# Patient Record
Sex: MALE | Race: WHITE | NOT HISPANIC OR LATINO | Employment: OTHER | URBAN - METROPOLITAN AREA
[De-identification: names, ages, dates, MRNs, and addresses within clinical notes are randomized per-mention and may not be internally consistent; named-entity substitution may affect disease eponyms.]

---

## 2023-12-11 ENCOUNTER — TELEPHONE (OUTPATIENT)
Dept: CARDIOLOGY | Facility: HOSPITAL | Age: 68
End: 2023-12-11
Payer: MEDICARE

## 2023-12-13 ENCOUNTER — HOSPITAL ENCOUNTER (OUTPATIENT)
Dept: RADIOLOGY | Facility: HOSPITAL | Age: 68
Discharge: HOME | End: 2023-12-13
Payer: MEDICARE

## 2023-12-13 ENCOUNTER — OFFICE VISIT (OUTPATIENT)
Dept: CARDIOLOGY | Facility: HOSPITAL | Age: 68
End: 2023-12-13
Payer: MEDICARE

## 2023-12-13 VITALS
WEIGHT: 195.55 LBS | SYSTOLIC BLOOD PRESSURE: 152 MMHG | OXYGEN SATURATION: 96 % | BODY MASS INDEX: 29.73 KG/M2 | DIASTOLIC BLOOD PRESSURE: 82 MMHG | HEART RATE: 78 BPM

## 2023-12-13 DIAGNOSIS — E78.5 HYPERLIPIDEMIA, UNSPECIFIED HYPERLIPIDEMIA TYPE: ICD-10-CM

## 2023-12-13 DIAGNOSIS — R06.02 SHORTNESS OF BREATH: Primary | ICD-10-CM

## 2023-12-13 DIAGNOSIS — I10 HYPERTENSION, UNSPECIFIED TYPE: ICD-10-CM

## 2023-12-13 DIAGNOSIS — I25.10 CORONARY ARTERY DISEASE INVOLVING NATIVE HEART WITHOUT ANGINA PECTORIS, UNSPECIFIED VESSEL OR LESION TYPE: ICD-10-CM

## 2023-12-13 DIAGNOSIS — R06.02 SHORTNESS OF BREATH: ICD-10-CM

## 2023-12-13 PROCEDURE — 1036F TOBACCO NON-USER: CPT | Performed by: NURSE PRACTITIONER

## 2023-12-13 PROCEDURE — 1159F MED LIST DOCD IN RCRD: CPT | Performed by: NURSE PRACTITIONER

## 2023-12-13 PROCEDURE — 1160F RVW MEDS BY RX/DR IN RCRD: CPT | Performed by: NURSE PRACTITIONER

## 2023-12-13 PROCEDURE — 99214 OFFICE O/P EST MOD 30 MIN: CPT | Performed by: NURSE PRACTITIONER

## 2023-12-13 PROCEDURE — 99214 OFFICE O/P EST MOD 30 MIN: CPT | Mod: PO,25 | Performed by: NURSE PRACTITIONER

## 2023-12-13 PROCEDURE — 71046 X-RAY EXAM CHEST 2 VIEWS: CPT | Mod: FY

## 2023-12-13 PROCEDURE — 71046 X-RAY EXAM CHEST 2 VIEWS: CPT | Performed by: STUDENT IN AN ORGANIZED HEALTH CARE EDUCATION/TRAINING PROGRAM

## 2023-12-13 PROCEDURE — 3077F SYST BP >= 140 MM HG: CPT | Performed by: NURSE PRACTITIONER

## 2023-12-13 PROCEDURE — 3079F DIAST BP 80-89 MM HG: CPT | Performed by: NURSE PRACTITIONER

## 2023-12-13 RX ORDER — ASPIRIN 81 MG/1
1 TABLET ORAL DAILY
COMMUNITY

## 2023-12-13 RX ORDER — ROSUVASTATIN CALCIUM 10 MG/1
10 TABLET, COATED ORAL DAILY
COMMUNITY

## 2023-12-13 RX ORDER — LOSARTAN POTASSIUM 100 MG/1
100 TABLET ORAL DAILY
COMMUNITY
Start: 2023-10-08 | End: 2024-01-06

## 2023-12-13 RX ORDER — CITALOPRAM 20 MG/1
20 TABLET, FILM COATED ORAL DAILY
COMMUNITY

## 2023-12-13 RX ORDER — MILK THISTLE 150 MG
1 CAPSULE ORAL DAILY
COMMUNITY
Start: 2022-02-16 | End: 2024-05-20 | Stop reason: ALTCHOICE

## 2023-12-13 RX ORDER — FAMOTIDINE 40 MG/1
40 TABLET, FILM COATED ORAL DAILY
COMMUNITY

## 2023-12-13 ASSESSMENT — ENCOUNTER SYMPTOMS
DEPRESSION: 0
CONSTITUTIONAL NEGATIVE: 1
ENDOCRINE NEGATIVE: 1
COUGH: 1
MYALGIAS: 1
PSYCHIATRIC NEGATIVE: 1
HEMATOLOGIC/LYMPHATIC NEGATIVE: 1
GASTROINTESTINAL NEGATIVE: 1
EYES NEGATIVE: 1
NEUROLOGICAL NEGATIVE: 1

## 2023-12-13 NOTE — PATIENT INSTRUCTIONS
CALL WITH ANY QUESTIONS   NO MEDICATION CHANGES   NUCLEAR STRESS TEST   CHEST X-RAY   WILL CALL TO REVIEW

## 2023-12-13 NOTE — PROGRESS NOTES
"Referred by Dr. Abrams ref. provider found for Chest Pain (Intermittent \"pressure\" and \"burning sensation\" s/p 3 days after receiving the RSV vaccine in the beginning of November of 2023.  States it can be in the L side, R side or both sides.) and Shortness of Breath (Upon physical exertion s/p the RSV vaccination.)     History Of Present Illness:    Brayan Barron Jr. is a very pleasant 68 year old gentleman with a history of CAD, HTN, HLD and mild aortic stenosis, he is here for a follow up visit. The patient is seen in collaboration with Dr. Lemus. Mr. Barron complains of chest tightness and a cough that lasted three and a half weeks after getting the RSV and flu shot. Since then has noticed he has shortness of breath and tightness in his chest walking up stairs and working in the yard. He states the symptoms are similar prior to having his stent placed.     Review of Systems   Constitutional: Negative.   HENT: Negative.     Eyes: Negative.    Cardiovascular:  Positive for chest pain.   Respiratory:  Positive for cough.    Endocrine: Negative.    Hematologic/Lymphatic: Negative.    Skin: Negative.    Musculoskeletal:  Positive for myalgias.   Gastrointestinal: Negative.    Neurological: Negative.    Psychiatric/Behavioral: Negative.            Past Medical History:  He has no past medical history on file.    Past Surgical History:  He has no past surgical history on file.      Social History:  He reports that he has never smoked. He has never used smokeless tobacco. He reports current alcohol use. He reports that he does not use drugs.    Family History:  No family history on file.     Allergies:  Codeine    Outpatient Medications:  Current Outpatient Medications   Medication Instructions    aspirin 81 mg EC tablet 1 tablet, oral, Daily    citalopram (CELEXA) 20 mg, oral, Daily    famotidine (PEPCID) 40 mg, oral, Daily    losartan (COZAAR) 100 mg, oral, Daily    quercetin 500 mg capsule 1 capsule, oral, Daily    " "rosuvastatin (CRESTOR) 10 mg, oral, Daily        Last Recorded Vitals:  Vitals:    12/13/23 1406   BP: (!) 172/100   Pulse: 78   SpO2: 96%   Weight: 88.7 kg (195 lb 8.8 oz)       Physical Exam:  Physical Exam  Vitals reviewed.   HENT:      Head: Normocephalic.      Nose: Nose normal.   Eyes:      Pupils: Pupils are equal, round, and reactive to light.   Cardiovascular:      Rate and Rhythm: Normal rate and regular rhythm. 2/6 YOON   Pulmonary:      Effort: Pulmonary effort is normal.      Breath sounds: Normal breath sounds.   Abdominal:      General: Abdomen is flat.      Palpations: Abdomen is soft.   Musculoskeletal:         General: Normal range of motion.      Cervical back: Normal range of motion.   Skin:     General: Skin is warm and dry.   Neurological:      General: No focal deficit present.      Mental Status: He is alert and oriented to person, place, and time.   Psychiatric:         Mood and Affect: Mood normal.            Last Labs:  CBC -  Lab Results   Component Value Date    WBC 7.7 01/28/2022    HGB 14.2 01/28/2022    HCT 44.4 01/28/2022    MCV 93 01/28/2022     01/28/2022       CMP -  Lab Results   Component Value Date    CALCIUM 9.9 01/28/2022       LIPID PANEL -   No results found for: \"CHOL\", \"TRIG\", \"HDL\", \"CHHDL\", \"LDLF\", \"VLDL\", \"NHDL\"    RENAL FUNCTION PANEL -   Lab Results   Component Value Date    GLUCOSE 94 01/28/2022     01/28/2022    K 4.8 01/28/2022     01/28/2022    CO2 29 01/28/2022    ANIONGAP 9 (L) 01/28/2022    BUN 32 (H) 01/28/2022    CREATININE 1.32 (H) 01/28/2022    GFRMALE 58 (A) 01/28/2022    CALCIUM 9.9 01/28/2022        No results found for: \"BNP\", \"HGBA1C\"    Last Cardiology Tests:  ECG:    Echo:  Echocardiogram 1/21/2022  1. The left ventricular systolic function is normal with a 60-65% estimated ejection fraction.  2. RVSP within normal limits.  3. Mild to moderate aortic valve stenosis.  4. The aortic valve dimensionless index is 0.35. There is no " evidence of aortic valve regurgitation. The peak instantaneous gradient of the aortic valve is 19.5 mmHg. The mean gradient of the aortic valve is 11.0 mmHg. SHERRI 1.44 cm2.  Ejection Fractions:  LVEF 60-65%  Cath:  Heart cath 2/4/2022  1. Left main: mild irregularities.  2. LAD: 30% mid-vessel disease, 50% ostial D1.  3. LCx: no significant angiographic disease.  4. RCA: 90% distal stenosis, 90% ostial rPDA stenosis, 90% ostial rPDA disease ([place] 1,1,1).  5. LVEDP 12mmHg, no aortic stenosis on LV-Ao gradient.  6. Successful PCI to severe distal RCA bifurcation stenosis ([place] 1,1,1) with one Synergy LIZANDRO with PTCA of rPLV sidebranch.    Stress Test:    Cardiac Imaging:  CT HEART CALCIUM SCORING WO IV CONTRAST 01/25/2022  CT calcium score 1/25/2022  LM 40.2,  .3,  LCx 0,  RCA 69.9,     Total 483.4    Assessment/Plan   Mr. Barron is a very pleasant 68 year old gentleman with a history of aortic stenosis, HTN, HLD and a family history of CAD, he had a CT calcium score of 483, heart cath with a PCI to the RCA (2022). He complains of chest pressure and exertional dyspnea. He will be set up for a nuclear stress test.  Blood pressure is elevated today, at home has been well controlled, will continue to monitor.      Plan   -call with any questions   -continue Aspirin indefinitely   -continue Losartan and Crestor   -nuclear stress test   -chest x-ray for persistent cough  -will call to review the results         Ruth Dumont, APRN-CNP

## 2023-12-20 ENCOUNTER — HOSPITAL ENCOUNTER (OUTPATIENT)
Dept: RADIOLOGY | Facility: HOSPITAL | Age: 68
Discharge: HOME | End: 2023-12-20
Payer: MEDICARE

## 2023-12-20 ENCOUNTER — HOSPITAL ENCOUNTER (OUTPATIENT)
Dept: CARDIOLOGY | Facility: HOSPITAL | Age: 68
Discharge: HOME | End: 2023-12-20
Payer: MEDICARE

## 2023-12-20 DIAGNOSIS — R06.02 SHORTNESS OF BREATH: ICD-10-CM

## 2023-12-20 PROCEDURE — 93017 CV STRESS TEST TRACING ONLY: CPT

## 2023-12-20 PROCEDURE — 78452 HT MUSCLE IMAGE SPECT MULT: CPT | Performed by: RADIOLOGY

## 2023-12-20 PROCEDURE — 93016 CV STRESS TEST SUPVJ ONLY: CPT | Performed by: STUDENT IN AN ORGANIZED HEALTH CARE EDUCATION/TRAINING PROGRAM

## 2023-12-20 PROCEDURE — 3430000001 HC RX 343 DIAGNOSTIC RADIOPHARMACEUTICALS: Performed by: NURSE PRACTITIONER

## 2023-12-20 PROCEDURE — 78452 HT MUSCLE IMAGE SPECT MULT: CPT

## 2023-12-20 PROCEDURE — A9502 TC99M TETROFOSMIN: HCPCS | Performed by: NURSE PRACTITIONER

## 2023-12-20 RX ADMIN — TETROFOSMIN 32.7 MILLICURIE: 0.23 INJECTION, POWDER, LYOPHILIZED, FOR SOLUTION INTRAVENOUS at 11:45

## 2023-12-20 RX ADMIN — TETROFOSMIN 10.6 MILLICURIE: 0.23 INJECTION, POWDER, LYOPHILIZED, FOR SOLUTION INTRAVENOUS at 10:32

## 2024-01-02 ENCOUNTER — TELEPHONE (OUTPATIENT)
Dept: CARDIOLOGY | Facility: HOSPITAL | Age: 69
End: 2024-01-02
Payer: MEDICARE

## 2024-01-02 NOTE — TELEPHONE ENCOUNTER
----- Message from JACKIE Aceves sent at 12/26/2023 12:52 PM EST -----  Regarding: FW:  Please call and let him know his stress test is normal   Thanks  ----- Message -----  From: Interface, Radiology Results In  Sent: 12/20/2023   3:03 PM EST  To: JACKIE Aceves

## 2024-05-17 PROBLEM — I25.10 CAD (CORONARY ARTERY DISEASE): Status: ACTIVE | Noted: 2024-05-17

## 2024-05-17 PROBLEM — I10 ESSENTIAL HYPERTENSION: Status: ACTIVE | Noted: 2024-05-17

## 2024-05-17 PROBLEM — I35.9 AORTIC VALVULAR DISORDER: Status: ACTIVE | Noted: 2024-05-17

## 2024-05-17 PROBLEM — E78.5 HYPERLIPIDEMIA: Status: ACTIVE | Noted: 2024-05-17

## 2024-05-17 PROBLEM — R06.02 SHORTNESS OF BREATH AT REST: Status: ACTIVE | Noted: 2024-05-17

## 2024-05-20 ENCOUNTER — OFFICE VISIT (OUTPATIENT)
Dept: PRIMARY CARE | Facility: CLINIC | Age: 69
End: 2024-05-20
Payer: MEDICARE

## 2024-05-20 ENCOUNTER — LAB (OUTPATIENT)
Dept: LAB | Facility: LAB | Age: 69
End: 2024-05-20
Payer: MEDICARE

## 2024-05-20 VITALS
HEART RATE: 76 BPM | HEIGHT: 68 IN | SYSTOLIC BLOOD PRESSURE: 142 MMHG | DIASTOLIC BLOOD PRESSURE: 90 MMHG | OXYGEN SATURATION: 98 % | WEIGHT: 188 LBS | BODY MASS INDEX: 28.49 KG/M2

## 2024-05-20 DIAGNOSIS — I35.9 AORTIC VALVULAR DISORDER: ICD-10-CM

## 2024-05-20 DIAGNOSIS — I10 ESSENTIAL HYPERTENSION: ICD-10-CM

## 2024-05-20 DIAGNOSIS — Z13.220 SCREENING, LIPID: ICD-10-CM

## 2024-05-20 DIAGNOSIS — R73.9 HYPERGLYCEMIA: ICD-10-CM

## 2024-05-20 DIAGNOSIS — E55.9 VITAMIN D DEFICIENCY: ICD-10-CM

## 2024-05-20 DIAGNOSIS — Z12.5 SCREENING FOR PROSTATE CANCER: ICD-10-CM

## 2024-05-20 DIAGNOSIS — Z00.00 ENCOUNTER FOR HEALTH MAINTENANCE EXAMINATION IN ADULT: ICD-10-CM

## 2024-05-20 DIAGNOSIS — E78.5 HYPERLIPIDEMIA, UNSPECIFIED HYPERLIPIDEMIA TYPE: ICD-10-CM

## 2024-05-20 DIAGNOSIS — F41.9 ANXIETY: ICD-10-CM

## 2024-05-20 DIAGNOSIS — I25.10 CORONARY ARTERY DISEASE INVOLVING NATIVE CORONARY ARTERY OF NATIVE HEART, UNSPECIFIED WHETHER ANGINA PRESENT: ICD-10-CM

## 2024-05-20 DIAGNOSIS — Z00.00 ENCOUNTER FOR HEALTH MAINTENANCE EXAMINATION IN ADULT: Primary | ICD-10-CM

## 2024-05-20 DIAGNOSIS — Z12.11 ENCOUNTER FOR SCREENING FOR MALIGNANT NEOPLASM OF COLON: ICD-10-CM

## 2024-05-20 LAB
25(OH)D3 SERPL-MCNC: 25 NG/ML (ref 30–100)
ALBUMIN SERPL BCP-MCNC: 4.4 G/DL (ref 3.4–5)
ALP SERPL-CCNC: 75 U/L (ref 33–136)
ALT SERPL W P-5'-P-CCNC: 13 U/L (ref 10–52)
ANION GAP SERPL CALC-SCNC: 12 MMOL/L (ref 10–20)
AST SERPL W P-5'-P-CCNC: 16 U/L (ref 9–39)
BASOPHILS # BLD AUTO: 0.11 X10*3/UL (ref 0–0.1)
BASOPHILS NFR BLD AUTO: 1.4 %
BILIRUB SERPL-MCNC: 0.6 MG/DL (ref 0–1.2)
BUN SERPL-MCNC: 26 MG/DL (ref 6–23)
CALCIUM SERPL-MCNC: 10 MG/DL (ref 8.6–10.3)
CHLORIDE SERPL-SCNC: 104 MMOL/L (ref 98–107)
CHOLEST SERPL-MCNC: 153 MG/DL (ref 0–199)
CHOLESTEROL/HDL RATIO: 3.3
CO2 SERPL-SCNC: 27 MMOL/L (ref 21–32)
CREAT SERPL-MCNC: 1.28 MG/DL (ref 0.5–1.3)
EGFRCR SERPLBLD CKD-EPI 2021: 60 ML/MIN/1.73M*2
EOSINOPHIL # BLD AUTO: 0.38 X10*3/UL (ref 0–0.4)
EOSINOPHIL NFR BLD AUTO: 5 %
ERYTHROCYTE [DISTWIDTH] IN BLOOD BY AUTOMATED COUNT: 13.1 % (ref 11.5–14.5)
EST. AVERAGE GLUCOSE BLD GHB EST-MCNC: 108 MG/DL
GLUCOSE SERPL-MCNC: 91 MG/DL (ref 74–99)
HBA1C MFR BLD: 5.4 %
HCT VFR BLD AUTO: 42.6 % (ref 41–52)
HDLC SERPL-MCNC: 46.5 MG/DL
HGB BLD-MCNC: 13.9 G/DL (ref 13.5–17.5)
IMM GRANULOCYTES # BLD AUTO: 0.02 X10*3/UL (ref 0–0.5)
IMM GRANULOCYTES NFR BLD AUTO: 0.3 % (ref 0–0.9)
LDLC SERPL CALC-MCNC: 76 MG/DL
LYMPHOCYTES # BLD AUTO: 2.13 X10*3/UL (ref 0.8–3)
LYMPHOCYTES NFR BLD AUTO: 28 %
MCH RBC QN AUTO: 30.9 PG (ref 26–34)
MCHC RBC AUTO-ENTMCNC: 32.6 G/DL (ref 32–36)
MCV RBC AUTO: 95 FL (ref 80–100)
MONOCYTES # BLD AUTO: 0.81 X10*3/UL (ref 0.05–0.8)
MONOCYTES NFR BLD AUTO: 10.6 %
NEUTROPHILS # BLD AUTO: 4.16 X10*3/UL (ref 1.6–5.5)
NEUTROPHILS NFR BLD AUTO: 54.7 %
NON HDL CHOLESTEROL: 107 MG/DL (ref 0–149)
NRBC BLD-RTO: 0 /100 WBCS (ref 0–0)
PLATELET # BLD AUTO: 236 X10*3/UL (ref 150–450)
POTASSIUM SERPL-SCNC: 5.4 MMOL/L (ref 3.5–5.3)
PROT SERPL-MCNC: 7.8 G/DL (ref 6.4–8.2)
PSA SERPL-MCNC: 0.59 NG/ML
RBC # BLD AUTO: 4.5 X10*6/UL (ref 4.5–5.9)
SODIUM SERPL-SCNC: 138 MMOL/L (ref 136–145)
TRIGL SERPL-MCNC: 151 MG/DL (ref 0–149)
TSH SERPL-ACNC: 1.96 MIU/L (ref 0.44–3.98)
VLDL: 30 MG/DL (ref 0–40)
WBC # BLD AUTO: 7.6 X10*3/UL (ref 4.4–11.3)

## 2024-05-20 PROCEDURE — 80061 LIPID PANEL: CPT

## 2024-05-20 PROCEDURE — 83036 HEMOGLOBIN GLYCOSYLATED A1C: CPT

## 2024-05-20 PROCEDURE — 99204 OFFICE O/P NEW MOD 45 MIN: CPT | Performed by: NURSE PRACTITIONER

## 2024-05-20 PROCEDURE — 1036F TOBACCO NON-USER: CPT | Performed by: NURSE PRACTITIONER

## 2024-05-20 PROCEDURE — 80053 COMPREHEN METABOLIC PANEL: CPT

## 2024-05-20 PROCEDURE — 1158F ADVNC CARE PLAN TLK DOCD: CPT | Performed by: NURSE PRACTITIONER

## 2024-05-20 PROCEDURE — G0103 PSA SCREENING: HCPCS

## 2024-05-20 PROCEDURE — 1123F ACP DISCUSS/DSCN MKR DOCD: CPT | Performed by: NURSE PRACTITIONER

## 2024-05-20 PROCEDURE — 84443 ASSAY THYROID STIM HORMONE: CPT

## 2024-05-20 PROCEDURE — 1160F RVW MEDS BY RX/DR IN RCRD: CPT | Performed by: NURSE PRACTITIONER

## 2024-05-20 PROCEDURE — 1170F FXNL STATUS ASSESSED: CPT | Performed by: NURSE PRACTITIONER

## 2024-05-20 PROCEDURE — 36415 COLL VENOUS BLD VENIPUNCTURE: CPT

## 2024-05-20 PROCEDURE — 82306 VITAMIN D 25 HYDROXY: CPT

## 2024-05-20 PROCEDURE — 3080F DIAST BP >= 90 MM HG: CPT | Performed by: NURSE PRACTITIONER

## 2024-05-20 PROCEDURE — 1126F AMNT PAIN NOTED NONE PRSNT: CPT | Performed by: NURSE PRACTITIONER

## 2024-05-20 PROCEDURE — 85025 COMPLETE CBC W/AUTO DIFF WBC: CPT

## 2024-05-20 PROCEDURE — 3077F SYST BP >= 140 MM HG: CPT | Performed by: NURSE PRACTITIONER

## 2024-05-20 PROCEDURE — 1159F MED LIST DOCD IN RCRD: CPT | Performed by: NURSE PRACTITIONER

## 2024-05-20 ASSESSMENT — ENCOUNTER SYMPTOMS
COUGH: 0
NAUSEA: 0
DIARRHEA: 0
TROUBLE SWALLOWING: 0
SEIZURES: 0
CHILLS: 0
ABDOMINAL DISTENTION: 0
EYE PAIN: 0
OCCASIONAL FEELINGS OF UNSTEADINESS: 0
COLOR CHANGE: 0
ADENOPATHY: 0
LOSS OF SENSATION IN FEET: 0
CONSTIPATION: 0
PALPITATIONS: 0
WEAKNESS: 0
FREQUENCY: 1
SHORTNESS OF BREATH: 0
HEADACHES: 0
WOUND: 0
FEVER: 0
DIFFICULTY URINATING: 0
DIZZINESS: 0
BRUISES/BLEEDS EASILY: 0
JOINT SWELLING: 0
WHEEZING: 0
ABDOMINAL PAIN: 0
ARTHRALGIAS: 1
DEPRESSION: 0
MYALGIAS: 0
FATIGUE: 0

## 2024-05-20 ASSESSMENT — PATIENT HEALTH QUESTIONNAIRE - PHQ9
SUM OF ALL RESPONSES TO PHQ9 QUESTIONS 1 AND 2: 0
1. LITTLE INTEREST OR PLEASURE IN DOING THINGS: NOT AT ALL
2. FEELING DOWN, DEPRESSED OR HOPELESS: NOT AT ALL

## 2024-05-20 ASSESSMENT — COLUMBIA-SUICIDE SEVERITY RATING SCALE - C-SSRS
2. HAVE YOU ACTUALLY HAD ANY THOUGHTS OF KILLING YOURSELF?: NO
1. IN THE PAST MONTH, HAVE YOU WISHED YOU WERE DEAD OR WISHED YOU COULD GO TO SLEEP AND NOT WAKE UP?: NO
6. HAVE YOU EVER DONE ANYTHING, STARTED TO DO ANYTHING, OR PREPARED TO DO ANYTHING TO END YOUR LIFE?: NO

## 2024-05-20 ASSESSMENT — ACTIVITIES OF DAILY LIVING (ADL)
TAKING_MEDICATION: INDEPENDENT
DRESSING: INDEPENDENT
GROCERY_SHOPPING: INDEPENDENT
BATHING: INDEPENDENT
DOING_HOUSEWORK: INDEPENDENT
MANAGING_FINANCES: INDEPENDENT

## 2024-05-20 ASSESSMENT — PAIN SCALES - GENERAL: PAINLEVEL: 0-NO PAIN

## 2024-05-20 NOTE — ASSESSMENT & PLAN NOTE
Patient continues on daily statin and aspirin per cardiology recommendations.  Recent stress test reviewed and found to be within acceptable limits.  Patient to notify provider for any new cardiac concerns

## 2024-05-20 NOTE — ASSESSMENT & PLAN NOTE
Most recent colonoscopy completed in 2015.  Patient states that he has had one prior colonoscopy positive for polyps approximately 15 years ago.  Moving forward would recommend that patient continue to complete colonoscopies for continued evaluation

## 2024-05-20 NOTE — ASSESSMENT & PLAN NOTE
Slight elevation in reading noted today.  At home log reviewed and patient continues to be within acceptable limits of blood pressure.  He is to notify provider for any persistent issues with hypo or hypertension, chest pain or pressure, headaches or blurred vision.  Patient to maintain routine follow-up with cardiology

## 2024-05-20 NOTE — PROGRESS NOTES
"Subjective   Patient ID: Brayan Barron Jr. is a 69 y.o. male who presents for Establish Care and Follow-up (Med review ).    Patient seen today in order to establish primary care. Patient seen sitting up in chair in no acute distress.  He is alert, oriented and appears in good spirits.  Patient has a significant cardiac history positive for coronary artery disease with stent placement and aortic stenosis.  He follows routinely with cardiology and takes all medications as prescribed.  Though an elevated blood pressure reading was noted during today's visit, his log was reviewed and his at home blood pressures are within acceptable readings.  His systolic blood pressures are always less than 150 and his diastolic blood pressures are always less than 90 at home.  Patient denies any current issues with shortness of breath, chest pain, headaches or blurred vision.  He does not smoke and only drinks alcohol minimally.  He states he remains relatively active despite being retired and walks at least 25 miles a week.  Patient follows a relatively healthy diet and does his best to stay hydrated. No reported issues with appetite or bowel movement. Chronic urinary frequency reported but patient states that this is not disruptive in his day to day life. Patient denies any issues with depression or insomnia but states \"I am an inpatient person\".  For this he takes citalopram with good effect.  Patient is  and states that he has a very good support system with his family.  He admits to very occasional right knee aches or lower back discomfort which she attributes to overdoing it sometimes.  Patient follows routinely with optometry, dentistry and dermatology.  Medications reviewed.  No other acute concerns voiced at this time.           Current Outpatient Medications on File Prior to Visit   Medication Sig Dispense Refill    aspirin 81 mg EC tablet Take 1 tablet (81 mg) by mouth once daily.      BERBERINE CHLOR-SEAWEED-CHROM " ORAL Take 1,200 mg by mouth once daily.      citalopram (CeleXA) 20 mg tablet Take 1 tablet (20 mg) by mouth once daily.      famotidine (Pepcid) 40 mg tablet Take 1 tablet (40 mg) by mouth once daily.      rosuvastatin (Crestor) 10 mg tablet Take 1 tablet (10 mg) by mouth once daily.      losartan (Cozaar) 100 mg tablet Take 1 tablet (100 mg) by mouth once daily.      [DISCONTINUED] quercetin 500 mg capsule Take 1 capsule by mouth once daily.       No current facility-administered medications on file prior to visit.       Past Medical History:   Diagnosis Date    CAD (coronary artery disease), native coronary artery     Colon polyps         Past Surgical History:   Procedure Laterality Date    CARDIAC CATHETERIZATION  02/04/2022    times 2    CATARACT EXTRACTION Left 02/02/2021    CATARACT EXTRACTION Right 10/29/2019    COLONOSCOPY  07/17/2015    HAND CONTRACTURE RELEASE Right 01/10/2018    dupuytrens contracture    KNEE Right 09/26/2008    KNEE Left 05/18/2015        No family history on file.     Review of Systems   Constitutional:  Negative for chills, fatigue and fever.   HENT:  Negative for dental problem and trouble swallowing.    Eyes:  Negative for pain and visual disturbance.        Wears glasses   Respiratory:  Negative for cough, shortness of breath and wheezing.    Cardiovascular:  Negative for chest pain, palpitations and leg swelling.        Positive for CAD with stents and valve disorder   Gastrointestinal:  Negative for abdominal distention, abdominal pain, constipation, diarrhea and nausea.   Endocrine: Negative for cold intolerance and heat intolerance.   Genitourinary:  Positive for frequency. Negative for difficulty urinating.        Positive for chronic frequency   Musculoskeletal:  Positive for arthralgias. Negative for gait problem, joint swelling and myalgias.        Positive for occasional joint discomfort with overuse   Skin:  Negative for color change, pallor, rash and wound.  "  Allergic/Immunologic: Negative for environmental allergies and food allergies.   Neurological:  Negative for dizziness, seizures, weakness and headaches.   Hematological:  Negative for adenopathy. Does not bruise/bleed easily.   Psychiatric/Behavioral:  Negative for behavioral problems.         Positive for some anxiousness? Better controled on citalopram   All other systems reviewed and are negative.      Objective   /90   Pulse 76   Ht 1.727 m (5' 8\")   Wt 85.3 kg (188 lb)   SpO2 98%   BMI 28.59 kg/m²     Physical Exam  Constitutional:       General: He is not in acute distress.     Appearance: Normal appearance. He is not toxic-appearing.   HENT:      Head: Normocephalic and atraumatic.      Right Ear: Tympanic membrane, ear canal and external ear normal.      Left Ear: Ear canal and external ear normal.      Nose: Nose normal.      Mouth/Throat:      Mouth: Mucous membranes are moist.      Pharynx: Oropharynx is clear.   Eyes:      Extraocular Movements: Extraocular movements intact.      Conjunctiva/sclera: Conjunctivae normal.      Pupils: Pupils are equal, round, and reactive to light.   Cardiovascular:      Rate and Rhythm: Normal rate and regular rhythm.      Pulses: Normal pulses.      Heart sounds: Murmur heard.   Pulmonary:      Effort: Pulmonary effort is normal.      Breath sounds: Normal breath sounds. No wheezing.   Abdominal:      General: Bowel sounds are normal.      Palpations: Abdomen is soft.   Musculoskeletal:         General: No swelling. Normal range of motion.      Cervical back: Normal range of motion and neck supple.   Skin:     General: Skin is warm and dry.   Neurological:      General: No focal deficit present.      Mental Status: He is alert and oriented to person, place, and time. Mental status is at baseline.      Cranial Nerves: No cranial nerve deficit.      Motor: No weakness.   Psychiatric:         Mood and Affect: Mood normal.         Behavior: Behavior normal.    "      Thought Content: Thought content normal.         Judgment: Judgment normal.         Assessment/Plan   Problem List Items Addressed This Visit             ICD-10-CM    Aortic valvular disorder I35.9     Most recent echo from 2022 showed: Mild to moderate aortic valve stenosis.  Patient states that he will realistically need to have surgical repair at some point but for now cardiology continues to monitor as outpatient.         CAD (coronary artery disease) I25.10     Patient continues on daily statin and aspirin per cardiology recommendations.  Recent stress test reviewed and found to be within acceptable limits.  Patient to notify provider for any new cardiac concerns         Essential hypertension I10     Slight elevation in reading noted today.  At home log reviewed and patient continues to be within acceptable limits of blood pressure.  He is to notify provider for any persistent issues with hypo or hypertension, chest pain or pressure, headaches or blurred vision.  Patient to maintain routine follow-up with cardiology         Relevant Orders    CBC and Auto Differential    Comprehensive Metabolic Panel    Lipid Panel    Hyperlipidemia E78.5     Patient continues on a daily statin.  Lipid panel ordered today for monitoring purposes.         Encounter for health maintenance examination in adult - Primary Z00.00     Routine blood work ordered today for monitoring purposes         Relevant Orders    CBC and Auto Differential    Comprehensive Metabolic Panel    Lipid Panel    TSH with reflex to Free T4 if abnormal    Prostate Specific Antigen    Encounter for screening for malignant neoplasm of colon Z12.11     Most recent colonoscopy completed in 2015.  Patient states that he has had one prior colonoscopy positive for polyps approximately 15 years ago.  Moving forward would recommend that patient continue to complete colonoscopies for continued evaluation         Anxiety F41.9     Mood/behaviors reportedly  stable on current dose of citalopram.  Patient to notify provider for any new concerns as this medication can be titrated as needed          Other Visit Diagnoses         Codes    Screening, lipid     Z13.220    Relevant Orders    Lipid Panel    Vitamin D deficiency     E55.9    Relevant Orders    Vitamin D 25-Hydroxy,Total (for eval of Vitamin D levels)    Hyperglycemia     R73.9    Relevant Orders    Hemoglobin A1C    Screening for prostate cancer     Z12.5    Relevant Orders    Prostate Specific Antigen

## 2024-05-20 NOTE — ASSESSMENT & PLAN NOTE
Mood/behaviors reportedly stable on current dose of citalopram.  Patient to notify provider for any new concerns as this medication can be titrated as needed

## 2024-05-20 NOTE — ASSESSMENT & PLAN NOTE
Most recent echo from 2022 showed: Mild to moderate aortic valve stenosis.  Patient states that he will realistically need to have surgical repair at some point but for now cardiology continues to monitor as outpatient.

## 2024-05-21 RX ORDER — CHOLECALCIFEROL (VITAMIN D3) 50 MCG
50 TABLET ORAL DAILY
Qty: 90 TABLET | Refills: 3 | Status: SHIPPED | OUTPATIENT
Start: 2024-05-21 | End: 2025-05-21

## 2024-07-15 ENCOUNTER — OFFICE VISIT (OUTPATIENT)
Dept: CARDIOLOGY | Facility: HOSPITAL | Age: 69
End: 2024-07-15
Payer: MEDICARE

## 2024-07-15 VITALS
HEART RATE: 67 BPM | SYSTOLIC BLOOD PRESSURE: 155 MMHG | OXYGEN SATURATION: 96 % | DIASTOLIC BLOOD PRESSURE: 90 MMHG | WEIGHT: 190.7 LBS | BODY MASS INDEX: 29 KG/M2

## 2024-07-15 DIAGNOSIS — I35.0 AORTIC VALVE STENOSIS, ETIOLOGY OF CARDIAC VALVE DISEASE UNSPECIFIED: Primary | ICD-10-CM

## 2024-07-15 DIAGNOSIS — I10 ESSENTIAL HYPERTENSION: Primary | ICD-10-CM

## 2024-07-15 DIAGNOSIS — I25.10 CORONARY ARTERY DISEASE INVOLVING NATIVE CORONARY ARTERY OF NATIVE HEART WITHOUT ANGINA PECTORIS: ICD-10-CM

## 2024-07-15 DIAGNOSIS — E78.5 HYPERLIPIDEMIA, UNSPECIFIED HYPERLIPIDEMIA TYPE: ICD-10-CM

## 2024-07-15 LAB
ATRIAL RATE: 66 BPM
P AXIS: 26 DEGREES
P OFFSET: 194 MS
P ONSET: 143 MS
PR INTERVAL: 164 MS
Q ONSET: 225 MS
QRS COUNT: 11 BEATS
QRS DURATION: 90 MS
QT INTERVAL: 392 MS
QTC CALCULATION(BAZETT): 410 MS
QTC FREDERICIA: 405 MS
R AXIS: 14 DEGREES
T AXIS: 42 DEGREES
T OFFSET: 421 MS
VENTRICULAR RATE: 66 BPM

## 2024-07-15 PROCEDURE — 99213 OFFICE O/P EST LOW 20 MIN: CPT | Performed by: NURSE PRACTITIONER

## 2024-07-15 PROCEDURE — 1036F TOBACCO NON-USER: CPT | Performed by: NURSE PRACTITIONER

## 2024-07-15 PROCEDURE — 1159F MED LIST DOCD IN RCRD: CPT | Performed by: NURSE PRACTITIONER

## 2024-07-15 PROCEDURE — 1157F ADVNC CARE PLAN IN RCRD: CPT | Performed by: NURSE PRACTITIONER

## 2024-07-15 PROCEDURE — 3080F DIAST BP >= 90 MM HG: CPT | Performed by: NURSE PRACTITIONER

## 2024-07-15 PROCEDURE — 3077F SYST BP >= 140 MM HG: CPT | Performed by: NURSE PRACTITIONER

## 2024-07-15 PROCEDURE — 93005 ELECTROCARDIOGRAM TRACING: CPT | Performed by: NURSE PRACTITIONER

## 2024-07-15 ASSESSMENT — ENCOUNTER SYMPTOMS
COUGH: 1
MYALGIAS: 1
PSYCHIATRIC NEGATIVE: 1
HEMATOLOGIC/LYMPHATIC NEGATIVE: 1
NEUROLOGICAL NEGATIVE: 1
EYES NEGATIVE: 1
ENDOCRINE NEGATIVE: 1
CONSTITUTIONAL NEGATIVE: 1
GASTROINTESTINAL NEGATIVE: 1

## 2024-07-15 NOTE — PROGRESS NOTES
Referred by Dr. Abrams ref. provider found for No chief complaint on file.     History Of Present Illness:    Brayan Barron Jr. is a very pleasant 69 year old gentleman with a history of CAD, HTN, HLD and mild aortic stenosis, he is here for a follow up visit. The patient is seen in collaboration with Dr. Lemus.  Mr. Barron continues to do well from a cardiac standpoint. He continues to stay active. Denies chest pain, shortness of breath or heart palpitations. Blood pressure at home has been well controlled.     Review of Systems   Constitutional: Negative.   HENT: Negative.     Eyes: Negative.    Cardiovascular:  Positive for chest pain.   Respiratory:  Positive for cough.    Endocrine: Negative.    Hematologic/Lymphatic: Negative.    Skin: Negative.    Musculoskeletal:  Positive for myalgias.   Gastrointestinal: Negative.    Neurological: Negative.    Psychiatric/Behavioral: Negative.            Past Medical History:  He has a past medical history of CAD (coronary artery disease), native coronary artery and Colon polyps.    Past Surgical History:  He has a past surgical history that includes XR knee (Right, 09/26/2008); XR knee (Left, 05/18/2015); Hand contracture release (Right, 01/10/2018); Cardiac catheterization (02/04/2022); Cataract extraction (Left, 02/02/2021); Cataract extraction (Right, 10/29/2019); and Colonoscopy (07/17/2015).      Social History:  He reports that he has never smoked. He has never used smokeless tobacco. He reports current alcohol use. He reports that he does not use drugs.    Family History:  No family history on file.     Allergies:  Codeine    Outpatient Medications:  Current Outpatient Medications   Medication Instructions    aspirin 81 mg EC tablet 1 tablet, oral, Daily    BERBERINE CHLOR-SEAWEED-CHROM ORAL 1,200 mg, oral, Daily    cholecalciferol (VITAMIN D-3) 50 mcg, oral, Daily    citalopram (CELEXA) 20 mg, oral, Daily    famotidine (PEPCID) 40 mg, oral, Daily    losartan (COZAAR)  100 mg, oral, Daily    rosuvastatin (CRESTOR) 10 mg, oral, Daily        Last Recorded Vitals:  There were no vitals filed for this visit.      Physical Exam:  Physical Exam  Vitals reviewed.   HENT:      Head: Normocephalic.      Nose: Nose normal.   Eyes:      Pupils: Pupils are equal, round, and reactive to light.   Cardiovascular:      Rate and Rhythm: Normal rate and regular rhythm. 2/6 YOON   Pulmonary:      Effort: Pulmonary effort is normal.      Breath sounds: Normal breath sounds.   Abdominal:      General: Abdomen is flat.      Palpations: Abdomen is soft.   Musculoskeletal:         General: Normal range of motion.      Cervical back: Normal range of motion.   Skin:     General: Skin is warm and dry.   Neurological:      General: No focal deficit present.      Mental Status: He is alert and oriented to person, place, and time.   Psychiatric:         Mood and Affect: Mood normal.            Last Labs:  CBC -  Lab Results   Component Value Date    WBC 7.6 05/20/2024    HGB 13.9 05/20/2024    HCT 42.6 05/20/2024    MCV 95 05/20/2024     05/20/2024       CMP -  Lab Results   Component Value Date    CALCIUM 10.0 05/20/2024    PROT 7.8 05/20/2024    ALBUMIN 4.4 05/20/2024    AST 16 05/20/2024    ALT 13 05/20/2024    ALKPHOS 75 05/20/2024    BILITOT 0.6 05/20/2024       LIPID PANEL -   Lab Results   Component Value Date    CHOL 153 05/20/2024    TRIG 151 (H) 05/20/2024    HDL 46.5 05/20/2024    CHHDL 3.3 05/20/2024    VLDL 30 05/20/2024    NHDL 107 05/20/2024       RENAL FUNCTION PANEL -   Lab Results   Component Value Date    GLUCOSE 91 05/20/2024     05/20/2024    K 5.4 (H) 05/20/2024     05/20/2024    CO2 27 05/20/2024    ANIONGAP 12 05/20/2024    BUN 26 (H) 05/20/2024    CREATININE 1.28 05/20/2024    GFRMALE 58 (A) 01/28/2022    CALCIUM 10.0 05/20/2024    ALBUMIN 4.4 05/20/2024        Lab Results   Component Value Date    HGBA1C 5.4 05/20/2024       Last Cardiology Tests:  ECG:  EKG  independently reviewed from today showed sinus rhythm heart rate 66 bpm   Echo:  Echocardiogram 1/21/2022  1. The left ventricular systolic function is normal with a 60-65% estimated ejection fraction.  2. RVSP within normal limits.  3. Mild to moderate aortic valve stenosis.  4. The aortic valve dimensionless index is 0.35. There is no evidence of aortic valve regurgitation. The peak instantaneous gradient of the aortic valve is 19.5 mmHg. The mean gradient of the aortic valve is 11.0 mmHg. SHERRI 1.44 cm2.  Ejection Fractions:  LVEF 60-65%  Cath:  Heart cath 2/4/2022  1. Left main: mild irregularities.  2. LAD: 30% mid-vessel disease, 50% ostial D1.  3. LCx: no significant angiographic disease.  4. RCA: 90% distal stenosis, 90% ostial rPDA stenosis, 90% ostial rPDA disease ([place] 1,1,1).  5. LVEDP 12mmHg, no aortic stenosis on LV-Ao gradient.  6. Successful PCI to severe distal RCA bifurcation stenosis ([place] 1,1,1) with one Synergy LIZANDRO with PTCA of rPLV sidebranch.    Stress Test:  Nuclear stress test 12/2023  1. Myocardial perfusion study without evidence of stress-induced  ischemia.  2. No evidence of myocardial infarction.  3. The left ventricle is normal in size.  4. Normal LV wall motion with an LV EF estimated at greater than 65%.      Cardiac Imaging:  CT HEART CALCIUM SCORING WO IV CONTRAST 01/25/2022  CT calcium score 1/25/2022  LM 40.2,  .3,  LCx 0,  RCA 69.9,     Total 483.4    Assessment/Plan   Mr. Barron is a very pleasant 69 year old gentleman with a history of aortic stenosis, HTN, HLD and a family history of CAD, he had a CT calcium score of 483, heart cath with a PCI to the RCA (2022). He continues to do well from a cardiac standpoint. Blood pressure at home has been well controlled at home, elevated today will continue to monitor.      Plan   -call with any questions   -continue Aspirin indefinitely   -continue Losartan and Crestor  -continue to monitor blood pressure at home   -follow up  in six months   -echocardiogram in six months           Ruth Dumont, APRN-CNP

## 2024-08-15 ENCOUNTER — TELEPHONE (OUTPATIENT)
Dept: PRIMARY CARE | Facility: CLINIC | Age: 69
End: 2024-08-15
Payer: MEDICARE

## 2024-08-15 DIAGNOSIS — F41.9 ANXIETY: ICD-10-CM

## 2024-08-15 DIAGNOSIS — E78.5 HYPERLIPIDEMIA, UNSPECIFIED HYPERLIPIDEMIA TYPE: ICD-10-CM

## 2024-08-15 RX ORDER — ROSUVASTATIN CALCIUM 10 MG/1
10 TABLET, COATED ORAL DAILY
Qty: 90 TABLET | Refills: 0 | Status: SHIPPED | OUTPATIENT
Start: 2024-08-15

## 2024-08-15 RX ORDER — CITALOPRAM 20 MG/1
20 TABLET, FILM COATED ORAL DAILY
Qty: 90 TABLET | Refills: 0 | Status: SHIPPED | OUTPATIENT
Start: 2024-08-15

## 2024-08-15 NOTE — TELEPHONE ENCOUNTER
MEDICATION REFILL    Pharmacy Name:     [facility]  Medication requested     Citalopram   20 mg   1x daily    Rosuvastatin   10 mg   1 x daily  Dosage [see above]   Quantity 90 days    Allergies     none givne  Date of last visit   05/20/2024  Date of Next Appointment  11/12/2024

## 2024-09-16 ENCOUNTER — TELEPHONE (OUTPATIENT)
Dept: PRIMARY CARE | Facility: CLINIC | Age: 69
End: 2024-09-16
Payer: MEDICARE

## 2024-09-16 DIAGNOSIS — F41.9 ANXIETY: ICD-10-CM

## 2024-09-16 RX ORDER — CITALOPRAM 20 MG/1
20 TABLET, FILM COATED ORAL DAILY
Qty: 90 TABLET | Refills: 0 | Status: SHIPPED | OUTPATIENT
Start: 2024-09-16

## 2024-09-16 NOTE — TELEPHONE ENCOUNTER
Rx Refill Request Telephone Encounter    Name:  Brayan Barron Jr.  :  [id number]  Medication Name:      citalopram (CeleXA) 20 mg tablet   Take 1 tablet (20 mg) by mouth once daily. - 90 day      Specific Pharmacy location:  [facility]  Date of last appointment:  2024  Date of next appointment:  2024  Best number to reach patient:  [phone]

## 2024-11-06 PROBLEM — M72.0 DUPUYTREN'S CONTRACTURE: Status: ACTIVE | Noted: 2018-01-10

## 2024-11-11 ENCOUNTER — TELEPHONE (OUTPATIENT)
Dept: PRIMARY CARE | Facility: CLINIC | Age: 69
End: 2024-11-11
Payer: MEDICARE

## 2024-11-11 DIAGNOSIS — E78.5 HYPERLIPIDEMIA, UNSPECIFIED HYPERLIPIDEMIA TYPE: ICD-10-CM

## 2024-11-11 DIAGNOSIS — K21.9 GASTROESOPHAGEAL REFLUX DISEASE, UNSPECIFIED WHETHER ESOPHAGITIS PRESENT: Primary | ICD-10-CM

## 2024-11-11 RX ORDER — ROSUVASTATIN CALCIUM 10 MG/1
10 TABLET, COATED ORAL DAILY
Qty: 90 TABLET | Refills: 0 | Status: SHIPPED | OUTPATIENT
Start: 2024-11-11

## 2024-11-11 RX ORDER — FAMOTIDINE 40 MG/1
40 TABLET, FILM COATED ORAL DAILY
Qty: 90 TABLET | Refills: 0 | Status: SHIPPED | OUTPATIENT
Start: 2024-11-11 | End: 2025-02-09

## 2024-11-11 NOTE — TELEPHONE ENCOUNTER
MEDICATION REFILL    Pharmacy Name:  [facility]  Medication requested          Rosuvastatin     10 mg    1 x daily         Famotidine       40       1 x daily  Dosage    [see above]  Quantity     90 days    Allergies    none given  Date of last visit    05/2024  Date of Next Appointment  01/2025                                (Resched from 11/12/2024)

## 2024-11-12 ENCOUNTER — APPOINTMENT (OUTPATIENT)
Dept: PRIMARY CARE | Facility: CLINIC | Age: 69
End: 2024-11-12
Payer: MEDICARE

## 2024-12-06 ENCOUNTER — APPOINTMENT (OUTPATIENT)
Dept: PRIMARY CARE | Facility: CLINIC | Age: 69
End: 2024-12-06
Payer: MEDICARE

## 2024-12-12 ENCOUNTER — TELEPHONE (OUTPATIENT)
Dept: PRIMARY CARE | Facility: CLINIC | Age: 69
End: 2024-12-12
Payer: MEDICARE

## 2024-12-12 DIAGNOSIS — I10 ESSENTIAL HYPERTENSION: ICD-10-CM

## 2024-12-12 NOTE — TELEPHONE ENCOUNTER
MEDICATION REFILL    Pharmacy Name:    [facility]  Medication requested                   Losartan   100 mg    Dosage     1 x daily  Quantity      90 days    Allergies    none given  Date of last visit    05/20/2024  Date of Next Appointment   01/21/2025

## 2024-12-19 RX ORDER — LOSARTAN POTASSIUM 100 MG/1
100 TABLET ORAL DAILY
Qty: 90 TABLET | Refills: 1 | Status: SHIPPED | OUTPATIENT
Start: 2024-12-19 | End: 2025-06-17

## 2024-12-19 NOTE — TELEPHONE ENCOUNTER
Patient called checking on the status of this refill. He states he has enough to get him through till Sunday

## 2025-01-15 ENCOUNTER — HOSPITAL ENCOUNTER (OUTPATIENT)
Dept: CARDIOLOGY | Facility: HOSPITAL | Age: 70
Discharge: HOME | End: 2025-01-15
Payer: MEDICARE

## 2025-01-15 ENCOUNTER — OFFICE VISIT (OUTPATIENT)
Dept: CARDIOLOGY | Facility: HOSPITAL | Age: 70
End: 2025-01-15
Payer: MEDICARE

## 2025-01-15 VITALS
DIASTOLIC BLOOD PRESSURE: 93 MMHG | SYSTOLIC BLOOD PRESSURE: 159 MMHG | BODY MASS INDEX: 29.7 KG/M2 | WEIGHT: 195.33 LBS | OXYGEN SATURATION: 96 % | HEART RATE: 67 BPM

## 2025-01-15 DIAGNOSIS — E78.5 HYPERLIPIDEMIA, UNSPECIFIED HYPERLIPIDEMIA TYPE: ICD-10-CM

## 2025-01-15 DIAGNOSIS — I25.10 CORONARY ARTERY DISEASE INVOLVING NATIVE CORONARY ARTERY OF NATIVE HEART WITHOUT ANGINA PECTORIS: Primary | ICD-10-CM

## 2025-01-15 DIAGNOSIS — I35.0 AORTIC VALVE STENOSIS, ETIOLOGY OF CARDIAC VALVE DISEASE UNSPECIFIED: ICD-10-CM

## 2025-01-15 DIAGNOSIS — R06.02 SHORTNESS OF BREATH: ICD-10-CM

## 2025-01-15 DIAGNOSIS — I10 ESSENTIAL HYPERTENSION: ICD-10-CM

## 2025-01-15 LAB
AORTIC VALVE MEAN GRADIENT: 16 MMHG
AORTIC VALVE PEAK VELOCITY: 2.58 M/S
AV PEAK GRADIENT: 27 MMHG
AVA (PEAK VEL): 1.11 CM2
AVA (VTI): 1.05 CM2
EJECTION FRACTION APICAL 4 CHAMBER: 64.6
EJECTION FRACTION: 58 %
LEFT ATRIUM VOLUME AREA LENGTH INDEX BSA: 22.9 ML/M2
LEFT VENTRICLE INTERNAL DIMENSION DIASTOLE: 4.61 CM (ref 3.5–6)
LEFT VENTRICULAR OUTFLOW TRACT DIAMETER: 2 CM
MITRAL VALVE E/A RATIO: 1.05
RIGHT VENTRICLE FREE WALL PEAK S': 15.8 CM/S
TRICUSPID ANNULAR PLANE SYSTOLIC EXCURSION: 2.8 CM

## 2025-01-15 PROCEDURE — 1159F MED LIST DOCD IN RCRD: CPT | Performed by: NURSE PRACTITIONER

## 2025-01-15 PROCEDURE — 3080F DIAST BP >= 90 MM HG: CPT | Performed by: NURSE PRACTITIONER

## 2025-01-15 PROCEDURE — 1157F ADVNC CARE PLAN IN RCRD: CPT | Performed by: NURSE PRACTITIONER

## 2025-01-15 PROCEDURE — G2211 COMPLEX E/M VISIT ADD ON: HCPCS | Performed by: NURSE PRACTITIONER

## 2025-01-15 PROCEDURE — 1036F TOBACCO NON-USER: CPT | Performed by: NURSE PRACTITIONER

## 2025-01-15 PROCEDURE — 93306 TTE W/DOPPLER COMPLETE: CPT | Performed by: INTERNAL MEDICINE

## 2025-01-15 PROCEDURE — 93306 TTE W/DOPPLER COMPLETE: CPT

## 2025-01-15 PROCEDURE — 99214 OFFICE O/P EST MOD 30 MIN: CPT | Performed by: NURSE PRACTITIONER

## 2025-01-15 PROCEDURE — 3077F SYST BP >= 140 MM HG: CPT | Performed by: NURSE PRACTITIONER

## 2025-01-15 PROCEDURE — 99214 OFFICE O/P EST MOD 30 MIN: CPT | Mod: 25 | Performed by: NURSE PRACTITIONER

## 2025-01-15 ASSESSMENT — ENCOUNTER SYMPTOMS
NEUROLOGICAL NEGATIVE: 1
MYALGIAS: 1
ENDOCRINE NEGATIVE: 1
HEMATOLOGIC/LYMPHATIC NEGATIVE: 1
PSYCHIATRIC NEGATIVE: 1
EYES NEGATIVE: 1
GASTROINTESTINAL NEGATIVE: 1
COUGH: 1
CONSTITUTIONAL NEGATIVE: 1

## 2025-01-15 NOTE — PATIENT INSTRUCTIONS
CALL WITH ANY QUESTIONS   WILL CALL TO REVIEW ECHOCARDIOGRAM   NO MEDICATION CHANGES   FOLLOW UP IN SIX MONTHS

## 2025-01-15 NOTE — PROGRESS NOTES
Referred by Dr. Aliza venegas. provider found for Follow-up (6 mth with ECHO.)     History Of Present Illness:    Brayan Barron Jr. is a very pleasant 70 year old gentleman with a history of CAD, HTN, HLD and mild aortic stenosis, he is here for a follow up visit. The patient is seen in collaboration with Dr. Lemus.  Mr. Barron continues to do well from a cardiac standpoint. He continues to stay active. Denies chest pain, shortness of breath or heart palpitations. Blood pressure at home has been well controlled. Notices occasional shortness of breath walking up the stairs.     Review of Systems   Constitutional: Negative.   HENT: Negative.     Eyes: Negative.    Cardiovascular:  Positive for chest pain.   Respiratory:  Positive for cough.    Endocrine: Negative.    Hematologic/Lymphatic: Negative.    Skin: Negative.    Musculoskeletal:  Positive for myalgias.   Gastrointestinal: Negative.    Neurological: Negative.    Psychiatric/Behavioral: Negative.            Past Medical History:  He has a past medical history of CAD (coronary artery disease), native coronary artery and Colon polyps.    Past Surgical History:  He has a past surgical history that includes XR knee (Right, 09/26/2008); XR knee (Left, 05/18/2015); Hand contracture release (Right, 01/10/2018); Cardiac catheterization (02/04/2022); Cataract extraction (Left, 02/02/2021); Cataract extraction (Right, 10/29/2019); and Colonoscopy (07/17/2015).      Social History:  He reports that he has never smoked. He has never used smokeless tobacco. He reports current alcohol use. He reports that he does not use drugs.    Family History:  No family history on file.     Allergies:  Codeine    Outpatient Medications:  Current Outpatient Medications   Medication Instructions    aspirin 81 mg EC tablet 1 tablet, Daily    BERBERINE CHLOR-SEAWEED-CHROM ORAL 1,200 mg, Daily    cholecalciferol (VITAMIN D-3) 50 mcg, oral, Daily    citalopram (CELEXA) 20 mg, oral, Daily    famotidine  (PEPCID) 40 mg, oral, Daily    losartan (COZAAR) 100 mg, oral, Daily    rosuvastatin (CRESTOR) 10 mg, oral, Daily        Last Recorded Vitals:  Vitals:    01/15/25 0854   BP: (!) 159/93   Pulse: 67   SpO2: 96%   Weight: 88.6 kg (195 lb 5.2 oz)         Physical Exam:  Physical Exam  Vitals reviewed.   HENT:      Head: Normocephalic.      Nose: Nose normal.   Eyes:      Pupils: Pupils are equal, round, and reactive to light.   Cardiovascular:      Rate and Rhythm: Normal rate and regular rhythm. 2/6 YOON   Pulmonary:      Effort: Pulmonary effort is normal.      Breath sounds: Normal breath sounds.   Abdominal:      General: Abdomen is flat.      Palpations: Abdomen is soft.   Musculoskeletal:         General: Normal range of motion.      Cervical back: Normal range of motion.   Skin:     General: Skin is warm and dry.   Neurological:      General: No focal deficit present.      Mental Status: He is alert and oriented to person, place, and time.   Psychiatric:         Mood and Affect: Mood normal.            Last Labs:  CBC -  Lab Results   Component Value Date    WBC 7.6 05/20/2024    HGB 13.9 05/20/2024    HCT 42.6 05/20/2024    MCV 95 05/20/2024     05/20/2024       CMP -  Lab Results   Component Value Date    CALCIUM 10.0 05/20/2024    PROT 7.8 05/20/2024    ALBUMIN 4.4 05/20/2024    AST 16 05/20/2024    ALT 13 05/20/2024    ALKPHOS 75 05/20/2024    BILITOT 0.6 05/20/2024       LIPID PANEL -   Lab Results   Component Value Date    CHOL 153 05/20/2024    TRIG 151 (H) 05/20/2024    HDL 46.5 05/20/2024    CHHDL 3.3 05/20/2024    VLDL 30 05/20/2024    NHDL 107 05/20/2024       RENAL FUNCTION PANEL -   Lab Results   Component Value Date    GLUCOSE 91 05/20/2024     05/20/2024    K 5.4 (H) 05/20/2024     05/20/2024    CO2 27 05/20/2024    ANIONGAP 12 05/20/2024    BUN 26 (H) 05/20/2024    CREATININE 1.28 05/20/2024    GFRMALE 58 (A) 01/28/2022    CALCIUM 10.0 05/20/2024    ALBUMIN 4.4 05/20/2024         Lab Results   Component Value Date    HGBA1C 5.4 05/20/2024       Last Cardiology Tests:  ECG:     Echo:  Echocardiogram 1/21/2022  1. The left ventricular systolic function is normal with a 60-65% estimated ejection fraction.  2. RVSP within normal limits.  3. Mild to moderate aortic valve stenosis.  4. The aortic valve dimensionless index is 0.35. There is no evidence of aortic valve regurgitation. The peak instantaneous gradient of the aortic valve is 19.5 mmHg. The mean gradient of the aortic valve is 11.0 mmHg. SHERRI 1.44 cm2.  Ejection Fractions:  LVEF 60-65%  Cath:  Heart cath 2/4/2022  1. Left main: mild irregularities.  2. LAD: 30% mid-vessel disease, 50% ostial D1.  3. LCx: no significant angiographic disease.  4. RCA: 90% distal stenosis, 90% ostial rPDA stenosis, 90% ostial rPDA disease ([place] 1,1,1).  5. LVEDP 12mmHg, no aortic stenosis on LV-Ao gradient.  6. Successful PCI to severe distal RCA bifurcation stenosis ([place] 1,1,1) with one Synergy LIZANDRO with PTCA of rPLV sidebranch.    Stress Test:  Nuclear stress test 12/2023  1. Myocardial perfusion study without evidence of stress-induced  ischemia.  2. No evidence of myocardial infarction.  3. The left ventricle is normal in size.  4. Normal LV wall motion with an LV EF estimated at greater than 65%.      Cardiac Imaging:  CT HEART CALCIUM SCORING WO IV CONTRAST 01/25/2022  CT calcium score 1/25/2022  LM 40.2,  .3,  LCx 0,  RCA 69.9,     Total 483.4    Assessment/Plan   Mr. Barron is a very pleasant 70 year old gentleman with a history of aortic stenosis, HTN, HLD and a family history of CAD, he had a CT calcium score of 483, heart cath with a PCI to the RCA (2022). He continues to do well from a cardiac standpoint continues to exercise on a regular basis. Blood pressure at home has been well controlled at home, elevated today will continue to monitor.      Plan   -call with any questions   -continue Aspirin 81 mg daily indefinitely    -continue Losartan 100 mg daily and Crestor 10 mg daily   -continue to monitor blood pressure at home   -follow up in six months         ZEENAT Espinoza-CNP

## 2025-01-16 ENCOUNTER — TELEPHONE (OUTPATIENT)
Dept: CARDIOLOGY | Facility: HOSPITAL | Age: 70
End: 2025-01-16
Payer: MEDICARE

## 2025-01-16 NOTE — TELEPHONE ENCOUNTER
----- Message from Ruth Lutz sent at 1/15/2025 12:14 PM EST -----  Please call and let him know he has a strong heart aortic stenosis a little worse repeat in one year   Thanks  ----- Message -----  From: Irving Syngo - Cardiology Results In  Sent: 1/15/2025  11:38 AM EST  To: Ruth Lutz, ZEENAT-CNP

## 2025-01-21 ENCOUNTER — APPOINTMENT (OUTPATIENT)
Dept: PRIMARY CARE | Facility: CLINIC | Age: 70
End: 2025-01-21
Payer: MEDICARE

## 2025-01-21 ENCOUNTER — TELEPHONE (OUTPATIENT)
Dept: CARDIOLOGY | Facility: HOSPITAL | Age: 70
End: 2025-01-21

## 2025-01-21 VITALS
SYSTOLIC BLOOD PRESSURE: 138 MMHG | HEIGHT: 68 IN | DIASTOLIC BLOOD PRESSURE: 82 MMHG | BODY MASS INDEX: 29.8 KG/M2 | WEIGHT: 196.6 LBS

## 2025-01-21 DIAGNOSIS — F41.9 ANXIETY: ICD-10-CM

## 2025-01-21 DIAGNOSIS — Z00.00 ROUTINE ADULT HEALTH MAINTENANCE: ICD-10-CM

## 2025-01-21 DIAGNOSIS — E55.9 VITAMIN D DEFICIENCY: ICD-10-CM

## 2025-01-21 DIAGNOSIS — Z00.00 ROUTINE GENERAL MEDICAL EXAMINATION AT HEALTH CARE FACILITY: Primary | ICD-10-CM

## 2025-01-21 DIAGNOSIS — I10 ESSENTIAL HYPERTENSION: ICD-10-CM

## 2025-01-21 DIAGNOSIS — Z12.12 ENCOUNTER FOR COLORECTAL CANCER SCREENING: ICD-10-CM

## 2025-01-21 DIAGNOSIS — Z00.00 ENCOUNTER FOR HEALTH MAINTENANCE EXAMINATION IN ADULT: ICD-10-CM

## 2025-01-21 DIAGNOSIS — Z12.11 ENCOUNTER FOR COLORECTAL CANCER SCREENING: ICD-10-CM

## 2025-01-21 DIAGNOSIS — I25.10 CORONARY ARTERY DISEASE INVOLVING NATIVE CORONARY ARTERY OF NATIVE HEART, UNSPECIFIED WHETHER ANGINA PRESENT: ICD-10-CM

## 2025-01-21 DIAGNOSIS — R73.09 BLOOD GLUCOSE ABNORMAL: ICD-10-CM

## 2025-01-21 DIAGNOSIS — E78.5 HYPERLIPIDEMIA, UNSPECIFIED HYPERLIPIDEMIA TYPE: ICD-10-CM

## 2025-01-21 DIAGNOSIS — I35.9 AORTIC VALVULAR DISORDER: ICD-10-CM

## 2025-01-21 PROCEDURE — 99214 OFFICE O/P EST MOD 30 MIN: CPT | Performed by: NURSE PRACTITIONER

## 2025-01-21 PROCEDURE — G0439 PPPS, SUBSEQ VISIT: HCPCS | Performed by: NURSE PRACTITIONER

## 2025-01-21 PROCEDURE — 1157F ADVNC CARE PLAN IN RCRD: CPT | Performed by: NURSE PRACTITIONER

## 2025-01-21 PROCEDURE — 1159F MED LIST DOCD IN RCRD: CPT | Performed by: NURSE PRACTITIONER

## 2025-01-21 PROCEDURE — 3079F DIAST BP 80-89 MM HG: CPT | Performed by: NURSE PRACTITIONER

## 2025-01-21 PROCEDURE — 1160F RVW MEDS BY RX/DR IN RCRD: CPT | Performed by: NURSE PRACTITIONER

## 2025-01-21 PROCEDURE — 1036F TOBACCO NON-USER: CPT | Performed by: NURSE PRACTITIONER

## 2025-01-21 PROCEDURE — 3008F BODY MASS INDEX DOCD: CPT | Performed by: NURSE PRACTITIONER

## 2025-01-21 PROCEDURE — 1170F FXNL STATUS ASSESSED: CPT | Performed by: NURSE PRACTITIONER

## 2025-01-21 PROCEDURE — 3075F SYST BP GE 130 - 139MM HG: CPT | Performed by: NURSE PRACTITIONER

## 2025-01-21 RX ORDER — AMLODIPINE BESYLATE 5 MG/1
5 TABLET ORAL DAILY
Qty: 90 TABLET | Refills: 3 | Status: SHIPPED | OUTPATIENT
Start: 2025-01-21 | End: 2026-01-21

## 2025-01-21 ASSESSMENT — ENCOUNTER SYMPTOMS
TROUBLE SWALLOWING: 0
SEIZURES: 0
WEAKNESS: 0
JOINT SWELLING: 0
CONSTIPATION: 0
HEADACHES: 0
DIARRHEA: 0
MYALGIAS: 0
WOUND: 0
ABDOMINAL DISTENTION: 0
FATIGUE: 0
DEPRESSION: 0
ADENOPATHY: 0
COUGH: 0
BRUISES/BLEEDS EASILY: 0
WHEEZING: 0
DIFFICULTY URINATING: 0
FEVER: 0
LOSS OF SENSATION IN FEET: 0
ABDOMINAL PAIN: 0
EYE PAIN: 0
SHORTNESS OF BREATH: 0
COLOR CHANGE: 0
DIZZINESS: 0
NAUSEA: 0
ARTHRALGIAS: 1
OCCASIONAL FEELINGS OF UNSTEADINESS: 0
FREQUENCY: 1
CHILLS: 0
PALPITATIONS: 0

## 2025-01-21 ASSESSMENT — ACTIVITIES OF DAILY LIVING (ADL)
TAKING_MEDICATION: INDEPENDENT
DOING_HOUSEWORK: INDEPENDENT
BATHING: INDEPENDENT
DRESSING: INDEPENDENT
GROCERY_SHOPPING: INDEPENDENT
MANAGING_FINANCES: INDEPENDENT

## 2025-01-21 ASSESSMENT — PATIENT HEALTH QUESTIONNAIRE - PHQ9
SUM OF ALL RESPONSES TO PHQ9 QUESTIONS 1 AND 2: 0
2. FEELING DOWN, DEPRESSED OR HOPELESS: NOT AT ALL
1. LITTLE INTEREST OR PLEASURE IN DOING THINGS: NOT AT ALL

## 2025-01-21 ASSESSMENT — COLUMBIA-SUICIDE SEVERITY RATING SCALE - C-SSRS
2. HAVE YOU ACTUALLY HAD ANY THOUGHTS OF KILLING YOURSELF?: NO
6. HAVE YOU EVER DONE ANYTHING, STARTED TO DO ANYTHING, OR PREPARED TO DO ANYTHING TO END YOUR LIFE?: NO
1. IN THE PAST MONTH, HAVE YOU WISHED YOU WERE DEAD OR WISHED YOU COULD GO TO SLEEP AND NOT WAKE UP?: NO

## 2025-01-21 NOTE — PATIENT INSTRUCTIONS
Please arrange for routine follow up in 6 months. You may schedule on-line through [facility] or call our office at [phone].       Orders are in place for you to have fasting blood work completed. Please do not eat or drink 8 hours prior to having your blood drawn.   You may have your blood work completed in this building through [facility] ([address], [place] [id number]).  Hours:   Monday - Friday: 7:30 a.m. - 5 p.m. and Saturday: 8 a.m. - 12 p.m.  Phone:  [phone]

## 2025-01-21 NOTE — ASSESSMENT & PLAN NOTE
"Most recent ECHO completed in January, 2025 and now shows moderate aortic valve stenosis.  Results showed \"1. The left ventricular systolic function is normal, with a visually estimated ejection fraction of 55-60%.  2. Spectral Doppler shows a Grade I (impaired relaxation pattern) of left ventricular diastolic filling with normal left atrial filling pressure.  3. There is normal right ventricular global systolic function.  4. Moderate aortic valve stenosis. The aortic valve dimensionless index is 0.33. There is no evidence of aortic valve regurgitation. The peak instantaneous gradient of the aortic valve is 27 mmHg. The mean gradient of the aortic valve is 16 mmHg\".  Patient states that he will realistically need to have surgical repair at some point but for now cardiology continues to monitor as outpatient.  "

## 2025-01-21 NOTE — ASSESSMENT & PLAN NOTE
Patient continues on a daily statin without issue.  Will continue to monitor fasting cholesterol panel routinely.

## 2025-01-21 NOTE — ASSESSMENT & PLAN NOTE
Routine blood work ordered today for monitoring purposes.  Will continue to monitor as appropriate  -Orders also placed today for screening colonoscopy as patient's last one was 10 years ago.

## 2025-01-21 NOTE — ASSESSMENT & PLAN NOTE
Patient continues on daily statin and aspirin per cardiology recommendations.  Recent stress test reviewed and found to be within acceptable limits.  Patient to notify provider for any new cardiac concerns.  He is to maintain routine follow-up with cardiology for continued evaluation and treatment recommendations

## 2025-01-21 NOTE — ASSESSMENT & PLAN NOTE
Mood/behaviors reportedly stable on current dose of citalopram.  Patient to notify provider for any new concerns as this medication can be titrated as needed   Vaginal delivery

## 2025-01-21 NOTE — ASSESSMENT & PLAN NOTE
Increased blood pressure readings noted upon review of patient's blood pressure log.  He states that he would like cardiology to manage her his blood pressure medication.  His nurse practitioner through cardiology has been notified and awaiting her response regarding the addition of medications for tighter blood pressure control.  Patient to continue to monitor vital signs routinely at home and notify provider for any new cardiac concerns.

## 2025-01-21 NOTE — TELEPHONE ENCOUNTER
----- Message from Ruth Lutz sent at 1/21/2025  1:04 PM EST -----  Regarding: RE: Elevated Blood Pressure Readings  Have him start Amlodipine 5 mg daily   Thanks  ----- Message -----  From: JACKIE Nagy  Sent: 1/21/2025  12:44 PM EST  To: JACKIE Aceves  Subject: Elevated Blood Pressure Readings                 Good afternoon Ruth,    I saw Brayan today for follow-up of primary care.  He brought in a log of his blood pressure readings for the past week.  The numbers appear to be trending up at times.  He wanted me to the review them with you to see if you would like to add any additional medications as he is currently on the maximum dose of losartan.  His log has been scanned under his chart under the media section!    Kind regards,  Beatriz Mcdermott CNP  [facility]

## 2025-01-21 NOTE — PROGRESS NOTES
Subjective   Patient ID: Brayan Barron Jr. is a 70 y.o. male who presents for Medicare Annual Wellness Visit Subsequent and Hypertension.    Patient seen today for routine follow-up, medication management and to complete an annual Medicare wellness exam.  Patient has a significant cardiac history positive for coronary artery disease with stent placement and aortic stenosis.  He follows routinely with cardiology and takes all medications as prescribed.  Reviewed recent echo with the patient some worsening aortic stenosis reported.  Patient denies any chest pain or increased edema but patient does admit to some increased shortness of breath with exertion.  Also discussed with patient some increased blood pressure readings over the past week.  Log reviewed and systolic blood pressure has fluctuated between 120s and 150s.  He denies any associated chest pain/pressure, headaches, blurred vision or other associated cardiac symptoms.  He does not smoke and only drinks alcohol minimally.  He states he remains relatively active despite being retired and works out several times a week.  Patient follows a relatively healthy diet and does his best to stay hydrated. No reported issues with appetite or bowel movement. Chronic urinary frequency reported but patient states that this is not disruptive in his day to day life.  No significant reports with insomnia or mood.  Patient is  and states that he has a very good support system with his family.  He admits to very occasional right knee aches or lower back discomfort which she attributes to overdoing it sometimes.  Patient follows routinely with optometry, dentistry and dermatology.  Medications reviewed.  No other acute concerns voiced at this time.       Current Outpatient Medications on File Prior to Visit   Medication Sig Dispense Refill    aspirin 81 mg EC tablet Take 1 tablet (81 mg) by mouth once daily.      BERBERINE CHLOR-SEAWEED-CHROM ORAL Take 1,200 mg by mouth  once daily.      cholecalciferol (Vitamin D-3) 50 MCG (2000 UT) tablet Take 1 tablet (50 mcg) by mouth once daily. 90 tablet 3    citalopram (CeleXA) 20 mg tablet Take 1 tablet (20 mg) by mouth once daily. 90 tablet 0    famotidine (Pepcid) 40 mg tablet Take 1 tablet (40 mg) by mouth once daily. 90 tablet 0    losartan (Cozaar) 100 mg tablet Take 1 tablet (100 mg) by mouth once daily. 90 tablet 1    rosuvastatin (Crestor) 10 mg tablet Take 1 tablet (10 mg) by mouth once daily. 90 tablet 0     No current facility-administered medications on file prior to visit.       Past Medical History:   Diagnosis Date    CAD (coronary artery disease), native coronary artery     Colon polyps         Past Surgical History:   Procedure Laterality Date    CARDIAC CATHETERIZATION  02/04/2022    times 2    CATARACT EXTRACTION Left 02/02/2021    CATARACT EXTRACTION Right 10/29/2019    COLONOSCOPY  07/17/2015    HAND CONTRACTURE RELEASE Right 01/10/2018    dupuytrens contracture    KNEE Right 09/26/2008    KNEE Left 05/18/2015        No family history on file.     Review of Systems   Constitutional:  Negative for chills, fatigue and fever.   HENT:  Negative for dental problem and trouble swallowing.    Eyes:  Negative for pain and visual disturbance.        Wears glasses   Respiratory:  Negative for cough, shortness of breath and wheezing.    Cardiovascular:  Negative for chest pain, palpitations and leg swelling.        Positive for CAD with stents and valve disorder   Gastrointestinal:  Negative for abdominal distention, abdominal pain, constipation, diarrhea and nausea.   Endocrine: Negative for cold intolerance and heat intolerance.   Genitourinary:  Positive for frequency. Negative for difficulty urinating.        Positive for chronic frequency   Musculoskeletal:  Positive for arthralgias. Negative for gait problem, joint swelling and myalgias.        Positive for occasional joint discomfort with overuse   Skin:  Negative for color  "change, pallor, rash and wound.   Allergic/Immunologic: Negative for environmental allergies and food allergies.   Neurological:  Negative for dizziness, seizures, weakness and headaches.   Hematological:  Negative for adenopathy. Does not bruise/bleed easily.   Psychiatric/Behavioral:  Negative for behavioral problems.         Positive for some anxiousness? Better controled on citalopram   All other systems reviewed and are negative.      Objective   /82   Ht 1.727 m (5' 8\")   Wt 89.2 kg (196 lb 9.6 oz)   BMI 29.89 kg/m²     Physical Exam  Constitutional:       General: He is not in acute distress.     Appearance: Normal appearance. He is not toxic-appearing.   HENT:      Head: Normocephalic and atraumatic.      Right Ear: External ear normal.      Left Ear: External ear normal.      Nose: Nose normal.      Mouth/Throat:      Mouth: Mucous membranes are moist.      Pharynx: Oropharynx is clear.   Eyes:      Extraocular Movements: Extraocular movements intact.      Conjunctiva/sclera: Conjunctivae normal.   Cardiovascular:      Rate and Rhythm: Normal rate and regular rhythm.      Pulses: Normal pulses.      Heart sounds: Murmur heard.   Pulmonary:      Effort: Pulmonary effort is normal.      Breath sounds: Normal breath sounds. No wheezing.   Abdominal:      General: Bowel sounds are normal.      Palpations: Abdomen is soft.   Musculoskeletal:         General: No swelling.      Cervical back: Normal range of motion and neck supple.   Skin:     General: Skin is warm and dry.   Neurological:      General: No focal deficit present.      Mental Status: He is alert and oriented to person, place, and time. Mental status is at baseline.      Cranial Nerves: No cranial nerve deficit.      Motor: No weakness.   Psychiatric:         Mood and Affect: Mood normal.         Behavior: Behavior normal.         Thought Content: Thought content normal.         Judgment: Judgment normal.         Assessment/Plan   Problem " "List Items Addressed This Visit             ICD-10-CM    Aortic valvular disorder I35.9     Most recent ECHO completed in January, 2025 and now shows moderate aortic valve stenosis.  Results showed \"1. The left ventricular systolic function is normal, with a visually estimated ejection fraction of 55-60%.  2. Spectral Doppler shows a Grade I (impaired relaxation pattern) of left ventricular diastolic filling with normal left atrial filling pressure.  3. There is normal right ventricular global systolic function.  4. Moderate aortic valve stenosis. The aortic valve dimensionless index is 0.33. There is no evidence of aortic valve regurgitation. The peak instantaneous gradient of the aortic valve is 27 mmHg. The mean gradient of the aortic valve is 16 mmHg\".  Patient states that he will realistically need to have surgical repair at some point but for now cardiology continues to monitor as outpatient.         CAD (coronary artery disease) I25.10     Patient continues on daily statin and aspirin per cardiology recommendations.  Recent stress test reviewed and found to be within acceptable limits.  Patient to notify provider for any new cardiac concerns.  He is to maintain routine follow-up with cardiology for continued evaluation and treatment recommendations         Essential hypertension I10     Increased blood pressure readings noted upon review of patient's blood pressure log.  He states that he would like cardiology to manage her his blood pressure medication.  His nurse practitioner through cardiology has been notified and awaiting her response regarding the addition of medications for tighter blood pressure control.  Patient to continue to monitor vital signs routinely at home and notify provider for any new cardiac concerns.          Relevant Orders    Comprehensive Metabolic Panel    CBC and Auto Differential    Hyperlipidemia E78.5     Patient continues on a daily statin without issue.  Will continue to " monitor fasting cholesterol panel routinely.         Encounter for health maintenance examination in adult Z00.00     Routine blood work ordered today for monitoring purposes.  Will continue to monitor as appropriate  -Orders also placed today for screening colonoscopy as patient's last one was 10 years ago.         Anxiety F41.9     Mood/behaviors reportedly stable on current dose of citalopram.  Patient to notify provider for any new concerns as this medication can be titrated as needed          Other Visit Diagnoses         Codes    Routine general medical examination at health care facility    -  Primary Z00.00    Relevant Orders    1 Year Follow Up In Primary Care - Wellness Exam    Blood glucose abnormal     R73.09    Relevant Orders    Hemoglobin A1C    Routine adult health maintenance     Z00.00    Relevant Orders    Hemoglobin A1C    Comprehensive Metabolic Panel    TSH with reflex to Free T4 if abnormal    Lipid Panel    Vitamin D 25-Hydroxy,Total (for eval of Vitamin D levels)    CBC and Auto Differential    Prostate Specific Antigen    Vitamin D deficiency     E55.9    Relevant Orders    Vitamin D 25-Hydroxy,Total (for eval of Vitamin D levels)    Encounter for colorectal cancer screening     Z12.11, Z12.12    Relevant Orders    Colonoscopy Screening; Average Risk Patient

## 2025-01-24 ENCOUNTER — LAB (OUTPATIENT)
Dept: LAB | Facility: LAB | Age: 70
End: 2025-01-24
Payer: MEDICARE

## 2025-01-24 DIAGNOSIS — R73.09 BLOOD GLUCOSE ABNORMAL: ICD-10-CM

## 2025-01-24 DIAGNOSIS — I10 ESSENTIAL HYPERTENSION: ICD-10-CM

## 2025-01-24 DIAGNOSIS — E55.9 VITAMIN D DEFICIENCY: ICD-10-CM

## 2025-01-24 DIAGNOSIS — Z00.00 ROUTINE ADULT HEALTH MAINTENANCE: ICD-10-CM

## 2025-01-24 LAB
25(OH)D3 SERPL-MCNC: 53 NG/ML (ref 30–100)
ALBUMIN SERPL BCP-MCNC: 4.3 G/DL (ref 3.4–5)
ALP SERPL-CCNC: 72 U/L (ref 33–136)
ALT SERPL W P-5'-P-CCNC: 14 U/L (ref 10–52)
ANION GAP SERPL CALC-SCNC: 13 MMOL/L (ref 10–20)
AST SERPL W P-5'-P-CCNC: 15 U/L (ref 9–39)
BASOPHILS # BLD AUTO: 0.15 X10*3/UL (ref 0–0.1)
BASOPHILS NFR BLD AUTO: 1.8 %
BILIRUB SERPL-MCNC: 0.6 MG/DL (ref 0–1.2)
BUN SERPL-MCNC: 19 MG/DL (ref 6–23)
CALCIUM SERPL-MCNC: 10.1 MG/DL (ref 8.6–10.3)
CHLORIDE SERPL-SCNC: 102 MMOL/L (ref 98–107)
CHOLEST SERPL-MCNC: 147 MG/DL (ref 0–199)
CHOLESTEROL/HDL RATIO: 3.4
CO2 SERPL-SCNC: 28 MMOL/L (ref 21–32)
CREAT SERPL-MCNC: 1.34 MG/DL (ref 0.5–1.3)
EGFRCR SERPLBLD CKD-EPI 2021: 56 ML/MIN/1.73M*2
EOSINOPHIL # BLD AUTO: 0.45 X10*3/UL (ref 0–0.4)
EOSINOPHIL NFR BLD AUTO: 5.4 %
ERYTHROCYTE [DISTWIDTH] IN BLOOD BY AUTOMATED COUNT: 13 % (ref 11.5–14.5)
EST. AVERAGE GLUCOSE BLD GHB EST-MCNC: 111 MG/DL
GLUCOSE SERPL-MCNC: 97 MG/DL (ref 74–99)
HBA1C MFR BLD: 5.5 %
HCT VFR BLD AUTO: 45.2 % (ref 41–52)
HDLC SERPL-MCNC: 42.9 MG/DL
HGB BLD-MCNC: 14.7 G/DL (ref 13.5–17.5)
IMM GRANULOCYTES # BLD AUTO: 0.03 X10*3/UL (ref 0–0.5)
IMM GRANULOCYTES NFR BLD AUTO: 0.4 % (ref 0–0.9)
LDLC SERPL CALC-MCNC: 77 MG/DL
LYMPHOCYTES # BLD AUTO: 2.08 X10*3/UL (ref 0.8–3)
LYMPHOCYTES NFR BLD AUTO: 24.8 %
MCH RBC QN AUTO: 31.2 PG (ref 26–34)
MCHC RBC AUTO-ENTMCNC: 32.5 G/DL (ref 32–36)
MCV RBC AUTO: 96 FL (ref 80–100)
MONOCYTES # BLD AUTO: 0.76 X10*3/UL (ref 0.05–0.8)
MONOCYTES NFR BLD AUTO: 9.1 %
NEUTROPHILS # BLD AUTO: 4.92 X10*3/UL (ref 1.6–5.5)
NEUTROPHILS NFR BLD AUTO: 58.5 %
NON HDL CHOLESTEROL: 104 MG/DL (ref 0–149)
NRBC BLD-RTO: 0 /100 WBCS (ref 0–0)
PLATELET # BLD AUTO: 237 X10*3/UL (ref 150–450)
POTASSIUM SERPL-SCNC: 4.9 MMOL/L (ref 3.5–5.3)
PROT SERPL-MCNC: 7.9 G/DL (ref 6.4–8.2)
PSA SERPL-MCNC: 0.59 NG/ML
RBC # BLD AUTO: 4.71 X10*6/UL (ref 4.5–5.9)
SODIUM SERPL-SCNC: 138 MMOL/L (ref 136–145)
TRIGL SERPL-MCNC: 134 MG/DL (ref 0–149)
TSH SERPL-ACNC: 1.93 MIU/L (ref 0.44–3.98)
VLDL: 27 MG/DL (ref 0–40)
WBC # BLD AUTO: 8.4 X10*3/UL (ref 4.4–11.3)

## 2025-01-24 PROCEDURE — 80053 COMPREHEN METABOLIC PANEL: CPT

## 2025-01-24 PROCEDURE — 80061 LIPID PANEL: CPT

## 2025-01-24 PROCEDURE — 83036 HEMOGLOBIN GLYCOSYLATED A1C: CPT

## 2025-01-24 PROCEDURE — 82306 VITAMIN D 25 HYDROXY: CPT

## 2025-01-24 PROCEDURE — 84443 ASSAY THYROID STIM HORMONE: CPT

## 2025-01-24 PROCEDURE — 84153 ASSAY OF PSA TOTAL: CPT

## 2025-01-24 PROCEDURE — 85025 COMPLETE CBC W/AUTO DIFF WBC: CPT

## 2025-01-29 ENCOUNTER — TELEPHONE (OUTPATIENT)
Dept: CARDIOLOGY | Facility: HOSPITAL | Age: 70
End: 2025-01-29
Payer: MEDICARE

## 2025-02-12 ENCOUNTER — TELEPHONE (OUTPATIENT)
Dept: PRIMARY CARE | Facility: CLINIC | Age: 70
End: 2025-02-12
Payer: MEDICARE

## 2025-02-12 DIAGNOSIS — K21.9 GASTROESOPHAGEAL REFLUX DISEASE, UNSPECIFIED WHETHER ESOPHAGITIS PRESENT: ICD-10-CM

## 2025-02-12 DIAGNOSIS — E78.5 HYPERLIPIDEMIA, UNSPECIFIED HYPERLIPIDEMIA TYPE: ICD-10-CM

## 2025-02-12 NOTE — TELEPHONE ENCOUNTER
MEDICATION REFILL    Pharmacy Name:    [facility]  Medication requested         Famotidine   40 mg   1 x daily        Rosuvastatin  10 mg    1x daily  Dosage [ see above ]   Quantity     90 days    Allergies  none given  Date of last visit   01/21/2025  Date of Next Appointment   07/22/2025

## 2025-02-13 RX ORDER — ROSUVASTATIN CALCIUM 10 MG/1
10 TABLET, COATED ORAL DAILY
Qty: 90 TABLET | Refills: 1 | Status: SHIPPED | OUTPATIENT
Start: 2025-02-13

## 2025-02-13 RX ORDER — FAMOTIDINE 40 MG/1
40 TABLET, FILM COATED ORAL DAILY
Qty: 90 TABLET | Refills: 1 | Status: SHIPPED | OUTPATIENT
Start: 2025-02-13 | End: 2025-08-12

## 2025-02-18 ENCOUNTER — ANESTHESIA EVENT (OUTPATIENT)
Dept: OPERATING ROOM | Facility: HOSPITAL | Age: 70
End: 2025-02-18
Payer: MEDICARE

## 2025-02-18 DIAGNOSIS — K21.9 GASTROESOPHAGEAL REFLUX DISEASE, UNSPECIFIED WHETHER ESOPHAGITIS PRESENT: ICD-10-CM

## 2025-02-18 RX ORDER — ACETAMINOPHEN 325 MG/1
650 TABLET ORAL EVERY 4 HOURS PRN
Status: CANCELLED | OUTPATIENT
Start: 2025-02-18

## 2025-02-18 RX ORDER — FAMOTIDINE 40 MG/1
40 TABLET, FILM COATED ORAL DAILY
Qty: 90 TABLET | Refills: 1 | Status: SHIPPED | OUTPATIENT
Start: 2025-02-18 | End: 2025-08-17

## 2025-02-18 RX ORDER — ONDANSETRON HYDROCHLORIDE 2 MG/ML
8 INJECTION, SOLUTION INTRAVENOUS ONCE
Status: CANCELLED | OUTPATIENT
Start: 2025-02-18 | End: 2025-02-18

## 2025-02-18 RX ORDER — ALBUTEROL SULFATE 0.83 MG/ML
2.5 SOLUTION RESPIRATORY (INHALATION) ONCE AS NEEDED
Status: CANCELLED | OUTPATIENT
Start: 2025-02-18

## 2025-02-19 ENCOUNTER — HOSPITAL ENCOUNTER (OUTPATIENT)
Dept: OPERATING ROOM | Facility: HOSPITAL | Age: 70
Setting detail: OUTPATIENT SURGERY
Discharge: HOME | End: 2025-02-19
Payer: MEDICARE

## 2025-02-19 ENCOUNTER — ANESTHESIA (OUTPATIENT)
Dept: OPERATING ROOM | Facility: HOSPITAL | Age: 70
End: 2025-02-19
Payer: MEDICARE

## 2025-02-19 VITALS
RESPIRATION RATE: 16 BRPM | DIASTOLIC BLOOD PRESSURE: 50 MMHG | HEIGHT: 68 IN | TEMPERATURE: 97.7 F | HEART RATE: 68 BPM | OXYGEN SATURATION: 98 % | BODY MASS INDEX: 29.67 KG/M2 | WEIGHT: 195.77 LBS | SYSTOLIC BLOOD PRESSURE: 107 MMHG

## 2025-02-19 DIAGNOSIS — Z12.11 ENCOUNTER FOR COLORECTAL CANCER SCREENING: ICD-10-CM

## 2025-02-19 DIAGNOSIS — Z12.12 ENCOUNTER FOR COLORECTAL CANCER SCREENING: ICD-10-CM

## 2025-02-19 PROCEDURE — 3700000002 HC GENERAL ANESTHESIA TIME - EACH INCREMENTAL 1 MINUTE: Performed by: STUDENT IN AN ORGANIZED HEALTH CARE EDUCATION/TRAINING PROGRAM

## 2025-02-19 PROCEDURE — 2500000004 HC RX 250 GENERAL PHARMACY W/ HCPCS (ALT 636 FOR OP/ED): Performed by: NURSE ANESTHETIST, CERTIFIED REGISTERED

## 2025-02-19 PROCEDURE — 7100000009 HC PHASE TWO TIME - INITIAL BASE CHARGE: Performed by: STUDENT IN AN ORGANIZED HEALTH CARE EDUCATION/TRAINING PROGRAM

## 2025-02-19 PROCEDURE — A45378 PR COLONOSCOPY,DIAGNOSTIC: Performed by: STUDENT IN AN ORGANIZED HEALTH CARE EDUCATION/TRAINING PROGRAM

## 2025-02-19 PROCEDURE — 3700000001 HC GENERAL ANESTHESIA TIME - INITIAL BASE CHARGE: Performed by: STUDENT IN AN ORGANIZED HEALTH CARE EDUCATION/TRAINING PROGRAM

## 2025-02-19 PROCEDURE — 2500000004 HC RX 250 GENERAL PHARMACY W/ HCPCS (ALT 636 FOR OP/ED): Performed by: ANESTHESIOLOGY

## 2025-02-19 PROCEDURE — G0121 COLON CA SCRN NOT HI RSK IND: HCPCS | Performed by: INTERNAL MEDICINE

## 2025-02-19 PROCEDURE — 7100000010 HC PHASE TWO TIME - EACH INCREMENTAL 1 MINUTE: Performed by: STUDENT IN AN ORGANIZED HEALTH CARE EDUCATION/TRAINING PROGRAM

## 2025-02-19 PROCEDURE — 3600000002 HC OR TIME - INITIAL BASE CHARGE - PROCEDURE LEVEL TWO: Performed by: STUDENT IN AN ORGANIZED HEALTH CARE EDUCATION/TRAINING PROGRAM

## 2025-02-19 PROCEDURE — A45378 PR COLONOSCOPY,DIAGNOSTIC: Performed by: NURSE ANESTHETIST, CERTIFIED REGISTERED

## 2025-02-19 PROCEDURE — 99100 ANES PT EXTEME AGE<1 YR&>70: CPT | Performed by: STUDENT IN AN ORGANIZED HEALTH CARE EDUCATION/TRAINING PROGRAM

## 2025-02-19 PROCEDURE — 3600000007 HC OR TIME - EACH INCREMENTAL 1 MINUTE - PROCEDURE LEVEL TWO: Performed by: STUDENT IN AN ORGANIZED HEALTH CARE EDUCATION/TRAINING PROGRAM

## 2025-02-19 RX ORDER — DEXMEDETOMIDINE IN 0.9 % NACL 20 MCG/5ML
SYRINGE (ML) INTRAVENOUS AS NEEDED
Status: DISCONTINUED | OUTPATIENT
Start: 2025-02-19 | End: 2025-02-19

## 2025-02-19 RX ORDER — PROPOFOL 10 MG/ML
INJECTION, EMULSION INTRAVENOUS AS NEEDED
Status: DISCONTINUED | OUTPATIENT
Start: 2025-02-19 | End: 2025-02-19

## 2025-02-19 RX ORDER — SODIUM CHLORIDE, SODIUM LACTATE, POTASSIUM CHLORIDE, CALCIUM CHLORIDE 600; 310; 30; 20 MG/100ML; MG/100ML; MG/100ML; MG/100ML
100 INJECTION, SOLUTION INTRAVENOUS CONTINUOUS
Status: ACTIVE | OUTPATIENT
Start: 2025-02-19 | End: 2025-02-19

## 2025-02-19 RX ADMIN — PROPOFOL 50 MG: 10 INJECTION, EMULSION INTRAVENOUS at 08:08

## 2025-02-19 RX ADMIN — PROPOFOL 30 MG: 10 INJECTION, EMULSION INTRAVENOUS at 08:18

## 2025-02-19 RX ADMIN — PROPOFOL 100 MCG/KG/MIN: 10 INJECTION, EMULSION INTRAVENOUS at 08:09

## 2025-02-19 RX ADMIN — Medication 20 MCG: at 08:10

## 2025-02-19 RX ADMIN — SODIUM CHLORIDE, POTASSIUM CHLORIDE, SODIUM LACTATE AND CALCIUM CHLORIDE 100 ML/HR: 600; 310; 30; 20 INJECTION, SOLUTION INTRAVENOUS at 07:47

## 2025-02-19 SDOH — HEALTH STABILITY: MENTAL HEALTH: CURRENT SMOKER: 0

## 2025-02-19 ASSESSMENT — PAIN SCALES - GENERAL: PAIN_LEVEL: 0

## 2025-02-19 ASSESSMENT — PAIN - FUNCTIONAL ASSESSMENT: PAIN_FUNCTIONAL_ASSESSMENT: 0-10

## 2025-02-19 NOTE — ANESTHESIA POSTPROCEDURE EVALUATION
Patient: Brayan Barron Jr.    Procedure Summary       Date: 02/19/25 Room / Location: [facility] OR    Anesthesia Start: 0806 Anesthesia Stop: 0829    Procedure: COLONOSCOPY Diagnosis: Encounter for colorectal cancer screening    Scheduled Providers: Juni Hernandez MD; Chiki Saenz MD Responsible Provider: Chiki Saenz MD    Anesthesia Type: general ASA Status: 3            Anesthesia Type: general    Vitals Value Taken Time   BP 96/60 02/19/25 0840   Temp 36.5 °C (97.7 °F) 02/19/25 0827   Pulse 66 02/19/25 0840   Resp 12 02/19/25 0917   SpO2 97 % 02/19/25 0832       Anesthesia Post Evaluation    Patient location during evaluation: bedside  Patient participation: complete - patient participated  Level of consciousness: awake  Pain score: 0  Pain management: adequate  Airway patency: patent  Cardiovascular status: hypotensive  Respiratory status: acceptable  Hydration status: acceptable  Postoperative Nausea and Vomiting: none  Comments: No symptoms with hypotension and recovered spontaneously with fluids open. No lightheadedness or chest pain or other symptoms. I personally evaluated and assessed him at the bedside.         No notable events documented.

## 2025-02-19 NOTE — ANESTHESIA PREPROCEDURE EVALUATION
Patient: Brayan Barron Jr.    Procedure Information       Date/Time: 02/19/25 0800    Scheduled providers: Juni Hernandez MD; Chiki Saenz MD    Procedure: COLONOSCOPY    Location: [facility] OR            Relevant Problems   Cardiac   (+) Aortic valvular disorder   (+) CAD (coronary artery disease)   (+) Essential hypertension   (+) Hyperlipidemia      Neuro   (+) Anxiety       Clinical information reviewed:                   NPO Detail:  No data recorded     Physical Exam    Airway  Mallampati: II  Neck ROM: full     Cardiovascular - normal exam     Dental - normal exam     Pulmonary - normal exam     Abdominal        Anesthesia Plan    History of general anesthesia?: yes  History of complications of general anesthesia?: no    ASA 3     general     The patient is not a current smoker.  Patient was not previously instructed to abstain from smoking on day of procedure.  Patient did not smoke on day of procedure.    Anesthetic plan and risks discussed with patient.  Use of blood products discussed with patient who consented to blood products.    Plan discussed with attending.

## 2025-03-03 DIAGNOSIS — K21.9 GASTROESOPHAGEAL REFLUX DISEASE, UNSPECIFIED WHETHER ESOPHAGITIS PRESENT: ICD-10-CM

## 2025-03-03 DIAGNOSIS — E55.9 VITAMIN D DEFICIENCY: ICD-10-CM

## 2025-03-03 RX ORDER — FAMOTIDINE 40 MG/1
40 TABLET, FILM COATED ORAL DAILY
Qty: 90 TABLET | Refills: 1 | Status: SHIPPED | OUTPATIENT
Start: 2025-03-03 | End: 2025-08-30

## 2025-03-03 RX ORDER — CHOLECALCIFEROL (VITAMIN D3) 50 MCG
50 TABLET ORAL DAILY
Qty: 90 TABLET | Refills: 1 | Status: SHIPPED | OUTPATIENT
Start: 2025-03-03 | End: 2026-03-03

## 2025-03-14 DIAGNOSIS — F41.9 ANXIETY: ICD-10-CM

## 2025-03-14 RX ORDER — CITALOPRAM 20 MG/1
20 TABLET, FILM COATED ORAL DAILY
Qty: 90 TABLET | Refills: 0 | Status: SHIPPED | OUTPATIENT
Start: 2025-03-14

## 2025-04-17 ENCOUNTER — TELEPHONE (OUTPATIENT)
Dept: PRIMARY CARE | Facility: CLINIC | Age: 70
End: 2025-04-17
Payer: MEDICARE

## 2025-04-17 DIAGNOSIS — I10 ESSENTIAL HYPERTENSION: ICD-10-CM

## 2025-04-17 RX ORDER — LOSARTAN POTASSIUM 100 MG/1
100 TABLET ORAL DAILY
Qty: 90 TABLET | Refills: 1 | Status: SHIPPED | OUTPATIENT
Start: 2025-04-17 | End: 2025-10-14

## 2025-04-17 NOTE — TELEPHONE ENCOUNTER
Rx Refill Request Telephone Encounter    Name:  Brayan Barron   :  [id number]  Medication Name:    LOSARTAN POTASSIUM 100MG Q/D 90 DAY   Specific Pharmacy location:  [facility]   Date of last appointment:  2025  Date of next appointment:  2025  Best number to reach patient:  [phone]

## 2025-05-23 ENCOUNTER — TELEPHONE (OUTPATIENT)
Dept: PRIMARY CARE | Facility: CLINIC | Age: 70
End: 2025-05-23
Payer: MEDICARE

## 2025-05-23 NOTE — TELEPHONE ENCOUNTER
Medication Refill    Pharmacy: [ [facility] ]    Medication: [ Rosuvastatin 10 mg ]    Quantity: [ 90 day ]    LOV: [ 01/21/25 ]    NOV: [ 07/22/25 ]

## 2025-06-05 ENCOUNTER — TELEPHONE (OUTPATIENT)
Dept: PRIMARY CARE | Facility: CLINIC | Age: 70
End: 2025-06-05
Payer: MEDICARE

## 2025-06-05 DIAGNOSIS — M25.561 RIGHT KNEE PAIN, UNSPECIFIED CHRONICITY: ICD-10-CM

## 2025-06-05 NOTE — TELEPHONE ENCOUNTER
Pt states he is starting to have a significant issue with his Rt knee.  He had orthopedic surgery on the knee a few years ago.  He would like a referral to orthopedic surgeon.

## 2025-07-04 ENCOUNTER — HOSPITAL ENCOUNTER (INPATIENT)
Facility: HOSPITAL | Age: 70
End: 2025-07-04
Attending: STUDENT IN AN ORGANIZED HEALTH CARE EDUCATION/TRAINING PROGRAM | Admitting: INTERNAL MEDICINE
Payer: MEDICARE

## 2025-07-04 ENCOUNTER — APPOINTMENT (OUTPATIENT)
Dept: RADIOLOGY | Facility: HOSPITAL | Age: 70
DRG: 322 | End: 2025-07-04
Payer: MEDICARE

## 2025-07-04 ENCOUNTER — APPOINTMENT (OUTPATIENT)
Dept: CARDIOLOGY | Facility: HOSPITAL | Age: 70
DRG: 322 | End: 2025-07-04
Payer: MEDICARE

## 2025-07-04 DIAGNOSIS — I35.9 AORTIC VALVULAR DISORDER: ICD-10-CM

## 2025-07-04 DIAGNOSIS — R06.02 SHORTNESS OF BREATH: Primary | ICD-10-CM

## 2025-07-04 DIAGNOSIS — I35.0 NONRHEUMATIC AORTIC (VALVE) STENOSIS: ICD-10-CM

## 2025-07-04 DIAGNOSIS — I25.10 CORONARY ARTERY DISEASE INVOLVING NATIVE CORONARY ARTERY OF NATIVE HEART, UNSPECIFIED WHETHER ANGINA PRESENT: ICD-10-CM

## 2025-07-04 DIAGNOSIS — R07.9 CHEST PAIN, UNSPECIFIED TYPE: ICD-10-CM

## 2025-07-04 DIAGNOSIS — E78.5 HYPERLIPIDEMIA, UNSPECIFIED HYPERLIPIDEMIA TYPE: ICD-10-CM

## 2025-07-04 DIAGNOSIS — I20.0 UNSTABLE ANGINA (MULTI): ICD-10-CM

## 2025-07-04 LAB
ANION GAP SERPL CALC-SCNC: 13 MMOL/L (ref 10–20)
BASOPHILS # BLD AUTO: 0.14 X10*3/UL (ref 0–0.1)
BASOPHILS NFR BLD AUTO: 1.4 %
BNP SERPL-MCNC: 18 PG/ML (ref 0–99)
BUN SERPL-MCNC: 29 MG/DL (ref 6–23)
CALCIUM SERPL-MCNC: 9.6 MG/DL (ref 8.6–10.3)
CARDIAC TROPONIN I PNL SERPL HS: 3 NG/L (ref 0–20)
CARDIAC TROPONIN I PNL SERPL HS: 3 NG/L (ref 0–20)
CHLORIDE SERPL-SCNC: 104 MMOL/L (ref 98–107)
CHOLEST SERPL-MCNC: 122 MG/DL (ref 0–199)
CHOLESTEROL/HDL RATIO: 3.5
CO2 SERPL-SCNC: 26 MMOL/L (ref 21–32)
CREAT SERPL-MCNC: 1.58 MG/DL (ref 0.5–1.3)
D DIMER PPP FEU-MCNC: 478 NG/ML FEU
EGFRCR SERPLBLD CKD-EPI 2021: 46 ML/MIN/1.73M*2
EOSINOPHIL # BLD AUTO: 0.77 X10*3/UL (ref 0–0.4)
EOSINOPHIL NFR BLD AUTO: 7.5 %
ERYTHROCYTE [DISTWIDTH] IN BLOOD BY AUTOMATED COUNT: 12.8 % (ref 11.5–14.5)
FLUAV RNA RESP QL NAA+PROBE: NOT DETECTED
FLUBV RNA RESP QL NAA+PROBE: NOT DETECTED
GLUCOSE SERPL-MCNC: 96 MG/DL (ref 74–99)
HCT VFR BLD AUTO: 39.9 % (ref 41–52)
HDLC SERPL-MCNC: 34.8 MG/DL
HGB BLD-MCNC: 13 G/DL (ref 13.5–17.5)
IMM GRANULOCYTES # BLD AUTO: 0.03 X10*3/UL (ref 0–0.5)
IMM GRANULOCYTES NFR BLD AUTO: 0.3 % (ref 0–0.9)
LDLC SERPL CALC-MCNC: 54 MG/DL
LYMPHOCYTES # BLD AUTO: 2.26 X10*3/UL (ref 0.8–3)
LYMPHOCYTES NFR BLD AUTO: 22.1 %
MAGNESIUM SERPL-MCNC: 1.97 MG/DL (ref 1.6–2.4)
MCH RBC QN AUTO: 30.2 PG (ref 26–34)
MCHC RBC AUTO-ENTMCNC: 32.6 G/DL (ref 32–36)
MCV RBC AUTO: 93 FL (ref 80–100)
MONOCYTES # BLD AUTO: 0.92 X10*3/UL (ref 0.05–0.8)
MONOCYTES NFR BLD AUTO: 9 %
NEUTROPHILS # BLD AUTO: 6.12 X10*3/UL (ref 1.6–5.5)
NEUTROPHILS NFR BLD AUTO: 59.7 %
NON HDL CHOLESTEROL: 87 MG/DL (ref 0–149)
NRBC BLD-RTO: 0 /100 WBCS (ref 0–0)
PLATELET # BLD AUTO: 260 X10*3/UL (ref 150–450)
POTASSIUM SERPL-SCNC: 4.5 MMOL/L (ref 3.5–5.3)
RBC # BLD AUTO: 4.31 X10*6/UL (ref 4.5–5.9)
SARS-COV-2 RNA RESP QL NAA+PROBE: NOT DETECTED
SODIUM SERPL-SCNC: 138 MMOL/L (ref 136–145)
TRIGL SERPL-MCNC: 168 MG/DL (ref 0–149)
VLDL: 34 MG/DL (ref 0–40)
WBC # BLD AUTO: 10.2 X10*3/UL (ref 4.4–11.3)

## 2025-07-04 PROCEDURE — 2500000001 HC RX 250 WO HCPCS SELF ADMINISTERED DRUGS (ALT 637 FOR MEDICARE OP): Performed by: STUDENT IN AN ORGANIZED HEALTH CARE EDUCATION/TRAINING PROGRAM

## 2025-07-04 PROCEDURE — 71045 X-RAY EXAM CHEST 1 VIEW: CPT | Mod: FOREIGN READ | Performed by: RADIOLOGY

## 2025-07-04 PROCEDURE — 80048 BASIC METABOLIC PNL TOTAL CA: CPT | Performed by: STUDENT IN AN ORGANIZED HEALTH CARE EDUCATION/TRAINING PROGRAM

## 2025-07-04 PROCEDURE — 93005 ELECTROCARDIOGRAM TRACING: CPT

## 2025-07-04 PROCEDURE — 84439 ASSAY OF FREE THYROXINE: CPT

## 2025-07-04 PROCEDURE — 85025 COMPLETE CBC W/AUTO DIFF WBC: CPT | Performed by: STUDENT IN AN ORGANIZED HEALTH CARE EDUCATION/TRAINING PROGRAM

## 2025-07-04 PROCEDURE — 87636 SARSCOV2 & INF A&B AMP PRB: CPT | Performed by: STUDENT IN AN ORGANIZED HEALTH CARE EDUCATION/TRAINING PROGRAM

## 2025-07-04 PROCEDURE — G0378 HOSPITAL OBSERVATION PER HR: HCPCS

## 2025-07-04 PROCEDURE — 71045 X-RAY EXAM CHEST 1 VIEW: CPT

## 2025-07-04 PROCEDURE — 99285 EMERGENCY DEPT VISIT HI MDM: CPT | Performed by: STUDENT IN AN ORGANIZED HEALTH CARE EDUCATION/TRAINING PROGRAM

## 2025-07-04 PROCEDURE — 85379 FIBRIN DEGRADATION QUANT: CPT | Performed by: STUDENT IN AN ORGANIZED HEALTH CARE EDUCATION/TRAINING PROGRAM

## 2025-07-04 PROCEDURE — 83880 ASSAY OF NATRIURETIC PEPTIDE: CPT | Performed by: STUDENT IN AN ORGANIZED HEALTH CARE EDUCATION/TRAINING PROGRAM

## 2025-07-04 PROCEDURE — 84443 ASSAY THYROID STIM HORMONE: CPT

## 2025-07-04 PROCEDURE — 36415 COLL VENOUS BLD VENIPUNCTURE: CPT | Performed by: STUDENT IN AN ORGANIZED HEALTH CARE EDUCATION/TRAINING PROGRAM

## 2025-07-04 PROCEDURE — 83036 HEMOGLOBIN GLYCOSYLATED A1C: CPT | Mod: GEALAB

## 2025-07-04 PROCEDURE — 83735 ASSAY OF MAGNESIUM: CPT | Performed by: STUDENT IN AN ORGANIZED HEALTH CARE EDUCATION/TRAINING PROGRAM

## 2025-07-04 PROCEDURE — 84484 ASSAY OF TROPONIN QUANT: CPT | Performed by: STUDENT IN AN ORGANIZED HEALTH CARE EDUCATION/TRAINING PROGRAM

## 2025-07-04 PROCEDURE — 80061 LIPID PANEL: CPT

## 2025-07-04 RX ORDER — AMLODIPINE BESYLATE 5 MG/1
5 TABLET ORAL DAILY
Status: DISPENSED | OUTPATIENT
Start: 2025-07-05

## 2025-07-04 RX ORDER — NITROGLYCERIN 0.4 MG/1
0.4 TABLET SUBLINGUAL ONCE
Status: COMPLETED | OUTPATIENT
Start: 2025-07-04 | End: 2025-07-04

## 2025-07-04 RX ORDER — ASPIRIN 81 MG/1
81 TABLET ORAL DAILY
Status: DISPENSED | OUTPATIENT
Start: 2025-07-05

## 2025-07-04 RX ORDER — NAPROXEN SODIUM 220 MG/1
324 TABLET, FILM COATED ORAL ONCE
Status: COMPLETED | OUTPATIENT
Start: 2025-07-04 | End: 2025-07-04

## 2025-07-04 RX ORDER — CITALOPRAM 20 MG/1
20 TABLET ORAL DAILY
Status: DISPENSED | OUTPATIENT
Start: 2025-07-05

## 2025-07-04 RX ORDER — ROSUVASTATIN CALCIUM 20 MG/1
20 TABLET, COATED ORAL DAILY
Status: DISCONTINUED | OUTPATIENT
Start: 2025-07-05 | End: 2025-07-05

## 2025-07-04 RX ORDER — FAMOTIDINE 20 MG/1
40 TABLET, FILM COATED ORAL DAILY
Status: DISPENSED | OUTPATIENT
Start: 2025-07-05

## 2025-07-04 RX ORDER — ENOXAPARIN SODIUM 100 MG/ML
40 INJECTION SUBCUTANEOUS EVERY 24 HOURS
Status: DISCONTINUED | OUTPATIENT
Start: 2025-07-04 | End: 2025-07-05

## 2025-07-04 RX ORDER — LOSARTAN POTASSIUM 50 MG/1
50 TABLET ORAL DAILY
Status: DISPENSED | OUTPATIENT
Start: 2025-07-05

## 2025-07-04 RX ADMIN — NITROGLYCERIN 0.4 MG: 0.4 TABLET SUBLINGUAL at 19:27

## 2025-07-04 RX ADMIN — ASPIRIN 324 MG: 81 TABLET, CHEWABLE ORAL at 19:29

## 2025-07-04 SDOH — SOCIAL STABILITY: SOCIAL INSECURITY
WITHIN THE LAST YEAR, HAVE YOU BEEN KICKED, HIT, SLAPPED, OR OTHERWISE PHYSICALLY HURT BY YOUR PARTNER OR EX-PARTNER?: NO

## 2025-07-04 SDOH — ECONOMIC STABILITY: INCOME INSECURITY: IN THE PAST 12 MONTHS HAS THE ELECTRIC, GAS, OIL, OR WATER COMPANY THREATENED TO SHUT OFF SERVICES IN YOUR HOME?: NO

## 2025-07-04 SDOH — ECONOMIC STABILITY: HOUSING INSECURITY: AT ANY TIME IN THE PAST 12 MONTHS, WERE YOU HOMELESS OR LIVING IN A SHELTER (INCLUDING NOW)?: NO

## 2025-07-04 SDOH — SOCIAL STABILITY: SOCIAL INSECURITY: WITHIN THE LAST YEAR, HAVE YOU BEEN HUMILIATED OR EMOTIONALLY ABUSED IN OTHER WAYS BY YOUR PARTNER OR EX-PARTNER?: NO

## 2025-07-04 SDOH — SOCIAL STABILITY: SOCIAL INSECURITY: WITHIN THE LAST YEAR, HAVE YOU BEEN AFRAID OF YOUR PARTNER OR EX-PARTNER?: NO

## 2025-07-04 SDOH — SOCIAL STABILITY: SOCIAL INSECURITY: ARE THERE ANY APPARENT SIGNS OF INJURIES/BEHAVIORS THAT COULD BE RELATED TO ABUSE/NEGLECT?: NO

## 2025-07-04 SDOH — ECONOMIC STABILITY: FOOD INSECURITY: HOW HARD IS IT FOR YOU TO PAY FOR THE VERY BASICS LIKE FOOD, HOUSING, MEDICAL CARE, AND HEATING?: NOT HARD AT ALL

## 2025-07-04 SDOH — ECONOMIC STABILITY: FOOD INSECURITY: WITHIN THE PAST 12 MONTHS, THE FOOD YOU BOUGHT JUST DIDN'T LAST AND YOU DIDN'T HAVE MONEY TO GET MORE.: NEVER TRUE

## 2025-07-04 SDOH — ECONOMIC STABILITY: HOUSING INSECURITY: IN THE LAST 12 MONTHS, WAS THERE A TIME WHEN YOU WERE NOT ABLE TO PAY THE MORTGAGE OR RENT ON TIME?: NO

## 2025-07-04 SDOH — ECONOMIC STABILITY: FOOD INSECURITY: WITHIN THE PAST 12 MONTHS, YOU WORRIED THAT YOUR FOOD WOULD RUN OUT BEFORE YOU GOT THE MONEY TO BUY MORE.: NEVER TRUE

## 2025-07-04 SDOH — ECONOMIC STABILITY: HOUSING INSECURITY: IN THE PAST 12 MONTHS, HOW MANY TIMES HAVE YOU MOVED WHERE YOU WERE LIVING?: 0

## 2025-07-04 SDOH — SOCIAL STABILITY: SOCIAL INSECURITY: HAS ANYONE EVER THREATENED TO HURT YOUR FAMILY OR YOUR PETS?: NO

## 2025-07-04 SDOH — SOCIAL STABILITY: SOCIAL INSECURITY: HAVE YOU HAD THOUGHTS OF HARMING ANYONE ELSE?: NO

## 2025-07-04 SDOH — SOCIAL STABILITY: SOCIAL INSECURITY: DO YOU FEEL UNSAFE GOING BACK TO THE PLACE WHERE YOU ARE LIVING?: NO

## 2025-07-04 SDOH — SOCIAL STABILITY: SOCIAL INSECURITY: ABUSE: ADULT

## 2025-07-04 SDOH — SOCIAL STABILITY: SOCIAL INSECURITY: DO YOU FEEL ANYONE HAS EXPLOITED OR TAKEN ADVANTAGE OF YOU FINANCIALLY OR OF YOUR PERSONAL PROPERTY?: NO

## 2025-07-04 SDOH — ECONOMIC STABILITY: TRANSPORTATION INSECURITY: IN THE PAST 12 MONTHS, HAS LACK OF TRANSPORTATION KEPT YOU FROM MEDICAL APPOINTMENTS OR FROM GETTING MEDICATIONS?: NO

## 2025-07-04 SDOH — SOCIAL STABILITY: SOCIAL INSECURITY: HAVE YOU HAD ANY THOUGHTS OF HARMING ANYONE ELSE?: NO

## 2025-07-04 SDOH — SOCIAL STABILITY: SOCIAL INSECURITY
WITHIN THE LAST YEAR, HAVE YOU BEEN RAPED OR FORCED TO HAVE ANY KIND OF SEXUAL ACTIVITY BY YOUR PARTNER OR EX-PARTNER?: NO

## 2025-07-04 SDOH — SOCIAL STABILITY: SOCIAL INSECURITY: WERE YOU ABLE TO COMPLETE ALL THE BEHAVIORAL HEALTH SCREENINGS?: YES

## 2025-07-04 SDOH — SOCIAL STABILITY: SOCIAL INSECURITY: ARE YOU OR HAVE YOU BEEN THREATENED OR ABUSED PHYSICALLY, EMOTIONALLY, OR SEXUALLY BY ANYONE?: NO

## 2025-07-04 SDOH — SOCIAL STABILITY: SOCIAL INSECURITY: DOES ANYONE TRY TO KEEP YOU FROM HAVING/CONTACTING OTHER FRIENDS OR DOING THINGS OUTSIDE YOUR HOME?: NO

## 2025-07-04 ASSESSMENT — PAIN DESCRIPTION - DESCRIPTORS
DESCRIPTORS: PRESSURE
DESCRIPTORS: PRESSURE

## 2025-07-04 ASSESSMENT — PAIN SCALES - GENERAL
PAINLEVEL_OUTOF10: 4
PAINLEVEL_OUTOF10: 4

## 2025-07-04 ASSESSMENT — ACTIVITIES OF DAILY LIVING (ADL)
ADEQUATE_TO_COMPLETE_ADL: YES
HEARING - LEFT EAR: FUNCTIONAL
TOILETING: INDEPENDENT
BATHING: INDEPENDENT
HEARING - RIGHT EAR: FUNCTIONAL
DRESSING YOURSELF: INDEPENDENT
LACK_OF_TRANSPORTATION: NO
ASSISTIVE_DEVICE: EYEGLASSES
GROOMING: INDEPENDENT
JUDGMENT_ADEQUATE_SAFELY_COMPLETE_DAILY_ACTIVITIES: YES
FEEDING YOURSELF: INDEPENDENT
PATIENT'S MEMORY ADEQUATE TO SAFELY COMPLETE DAILY ACTIVITIES?: YES
WALKS IN HOME: INDEPENDENT

## 2025-07-04 ASSESSMENT — PAIN DESCRIPTION - LOCATION: LOCATION: CHEST

## 2025-07-04 ASSESSMENT — COGNITIVE AND FUNCTIONAL STATUS - GENERAL
MOBILITY SCORE: 24
DAILY ACTIVITIY SCORE: 24
PATIENT BASELINE BEDBOUND: NO

## 2025-07-04 ASSESSMENT — PAIN DESCRIPTION - ORIENTATION: ORIENTATION: UPPER

## 2025-07-04 ASSESSMENT — LIFESTYLE VARIABLES
EVER FELT BAD OR GUILTY ABOUT YOUR DRINKING: NO
SKIP TO QUESTIONS 9-10: 1
AUDIT-C TOTAL SCORE: 3
HOW MANY STANDARD DRINKS CONTAINING ALCOHOL DO YOU HAVE ON A TYPICAL DAY: 1 OR 2
AUDIT-C TOTAL SCORE: 3
EVER HAD A DRINK FIRST THING IN THE MORNING TO STEADY YOUR NERVES TO GET RID OF A HANGOVER: NO
HOW OFTEN DO YOU HAVE A DRINK CONTAINING ALCOHOL: 2-3 TIMES A WEEK
HAVE YOU EVER FELT YOU SHOULD CUT DOWN ON YOUR DRINKING: NO
TOTAL SCORE: 0
HAVE PEOPLE ANNOYED YOU BY CRITICIZING YOUR DRINKING: NO
HOW OFTEN DO YOU HAVE 6 OR MORE DRINKS ON ONE OCCASION: NEVER

## 2025-07-04 ASSESSMENT — PAIN DESCRIPTION - PAIN TYPE: TYPE: ACUTE PAIN

## 2025-07-04 ASSESSMENT — PATIENT HEALTH QUESTIONNAIRE - PHQ9
SUM OF ALL RESPONSES TO PHQ9 QUESTIONS 1 & 2: 0
2. FEELING DOWN, DEPRESSED OR HOPELESS: NOT AT ALL
1. LITTLE INTEREST OR PLEASURE IN DOING THINGS: NOT AT ALL

## 2025-07-04 ASSESSMENT — PAIN - FUNCTIONAL ASSESSMENT: PAIN_FUNCTIONAL_ASSESSMENT: 0-10

## 2025-07-04 NOTE — Clinical Note
Angioplasty of the left anterior descending diagonal lesion. Inflation 1: Pressure = 12 tommie; Duration = 26 sec.

## 2025-07-04 NOTE — Clinical Note
Vessel: LAD (diagonal). Guidewire inserted. Attempt to get wire down diag through struts unsuccessful at the moment

## 2025-07-04 NOTE — Clinical Note
Angioplasty of the left anterior descending diagonal lesion. Inflation 1: Pressure = 12 tommie; Duration = 30 sec.

## 2025-07-04 NOTE — Clinical Note
Balloon removed. Patient ID: Lisa Smoker  MRN: [id number]  : 1935    Chief Complaint   Patient presents with   â¢ Office Visit   â¢ Follow-up     dm/ discuss medications       HPI    Patient is 78years old -American male who is here today to discuss management of diabetes  His A1c has been above goal for several years. I have been trying different medications in addition to his Metformin glipizide and Actos but unfortunately he has developed side effects or intolerance. He had tried [facility] and [place]. He is here to talk about it because he wants his diabetes to be controlled  He refuses and afraid of insulin because his friend  in a nursing home from what he believes from insulin injection. Past Medical History:   Diagnosis Date   â¢ Arthritis    â¢ Diabetes mellitus (CMS/HCC)    â¢ Malignant neoplasm (CMS/HCC)        No family history on file. Past Surgical History:   Procedure Laterality Date   â¢ Cataract extraction, bilateral     â¢ Cervical spine surgery N/A    â¢ Eye surgery     â¢ Lumbar disc surgery         Social History     Socioeconomic History   â¢ Marital status:       Spouse name: Not on file   â¢ Number of children: Not on file   â¢ Years of education: Not on file   â¢ Highest education level: Not on file   Occupational History   â¢ Not on file   Social Needs   â¢ Financial resource strain: Not on file   â¢ Food insecurity     Worry: Not on file     Inability: Not on file   â¢ Transportation needs     Medical: Not on file     Non-medical: Not on file   Tobacco Use   â¢ Smoking status: Never Smoker   â¢ Smokeless tobacco: Never Used   Substance and Sexual Activity   â¢ Alcohol use: No     Frequency: Never   â¢ Drug use: No   â¢ Sexual activity: Not Currently   Lifestyle   â¢ Physical activity     Days per week: 0 days     Minutes per session: 0 min   â¢ Stress: Not at all   Relationships   â¢ Social connections     Talks on phone: Not on file     Gets together: Not on file     Attends Islam service: Not on file     Active member of club or organization: Not on file     Attends meetings of clubs or organizations: Not on file     Relationship status: Not on file   â¢ Intimate partner violence     Fear of current or ex partner: Not on file     Emotionally abused: Not on file     Physically abused: Not on file     Forced sexual activity: Not on file   Other Topics Concern   â¢ Not on file   Social History Narrative   â¢ Not on file       Current Outpatient Medications   Medication Sig Dispense Refill   â¢ pioglitazone (ACTOS) 45 MG tablet Take 1 tablet by mouth daily. 90 tablet 0   â¢ dulaglutide (TRULICITY) 3.15 GE/8.7OL pen-injector Inject 0.75 mg into the skin every 7 days. 2 mL 3   â¢ metformin (GLUCOPHAGE) 1000 MG tablet TAKE 1 TABLET BY MOUTH TWICE DAILY 180 tablet 1   â¢ glipiZIDE (GLUCOTROL ER) 10 MG 24 hr tablet TAKE 1 TABLET BY MOUTH TWICE DAILY 180 tablet 0   â¢ hydrochlorothiazide (HYDRODIURIL) 25 MG tablet Take 1 tablet by mouth daily. 90 tablet 3   â¢ tamsulosin (FLOMAX) 0.4 MG Cap TAKE 2 CAPSULES BY MOUTH AT BEDTIME 180 capsule 0   â¢ Turmeric 400 MG Cap Take 150 mg by mouth daily. â¢ levocetirizine (XYZAL) 5 MG tablet TAKE 1 TABLET BY MOUTH EVERY EVENING 30 tablet 3   â¢ gentamicin (GARAMYCIN) 0.3 % ophthalmic solution INSTILL 1 DROP IN BOTH EYES EVERY 4 HOURS 15 mL 0   â¢ blood glucose test strip Test blood sugar 2 times daily as directed. Diagnosis: E11.21 200 strip 3   â¢ Blood Glucose Monitoring Suppl w/Device Kit Use as directed 1 kit 0   â¢ Lancets 30G Misc Use as directed 200 each 3   â¢ gabapentin (NEURONTIN) 300 MG capsule Take 300 mg by mouth daily. â¢ ferrous sulfate 325 (65 FE) MG tablet Take 325 mg by mouth daily (with breakfast). â¢ omeprazole (PriLOSEC) 40 MG capsule TAKE 1 CAPSULE BY MOUTH EVERY DAY 90 capsule 1   â¢ fluticasone (FLONASE) 50 MCG/ACT nasal spray Spray 2 sprays in each nostril daily.  16 g 3   â¢ donepezil (ARICEPT) 10 MG tablet TAKE 1 TABLET BY MOUTH DAILY "AS DIRECTED 90 tablet 3   â¢ atorvastatin (LIPITOR) 40 MG tablet Take 1 tablet by mouth at bedtime. 90 tablet 3   â¢ dilTIAZem (CARTIA XT) 300 MG 24 hr capsule Take 1 capsule by mouth daily. 90 capsule 0   â¢ diclofenac (VOLTAREN) 1 % gel Apply to the neck and back 3 times daily 300 g 3   â¢ lidocaine (XYLOCAINE) 5 % ointment APPLY TO THE AFFECTED AREA AS DIRECTED       No current facility-administered medications for this visit. ALLERGIES:   Allergen Reactions   â¢ Codeine Other (See Comments)     Unknown   â¢ Lisinopril Other (See Comments)     angioedema         ROS  Review of Systems   Constitutional: Negative. Negative for activity change and fatigue. HENT: Negative. Respiratory: Negative for shortness of breath. Cardiovascular: Negative for chest pain, palpitations and leg swelling. Gastrointestinal: Negative. Genitourinary: Positive for frequency. Musculoskeletal: Positive for back pain and neck pain. Negative for arthralgias. Neurological: Positive for numbness. Psychiatric/Behavioral: Negative. Physical:  Visit Vitals  /74 (BP Location: RUE - Right upper extremity)   Pulse 77   Temp 98.1 Â°F (36.7 Â°C)   Resp 18   Ht 5' 7"" (1.702 m)   Wt 79.1 kg (174 lb 6.1 oz)   SpO2 97%   BMI 27.31 kg/mÂ²       Physical Exam  Constitutional:       Appearance: Normal appearance. He is well-developed. HENT:      Head: Normocephalic. Eyes:      Pupils: Pupils are equal, round, and reactive to light. Neck:      Musculoskeletal: Normal range of motion and neck supple. Comments: Patient did have slightly restricted range of motion in the neck  Cardiovascular:      Rate and Rhythm: Normal rate and regular rhythm. Heart sounds: Normal heart sounds. Pulmonary:      Effort: Pulmonary effort is normal.      Breath sounds: Normal breath sounds. Abdominal:      General: Bowel sounds are normal. There is no distension. Palpations: Abdomen is soft. Tenderness:  There is no " abdominal tenderness. Musculoskeletal:      Comments: Low back pain  Neck pain present on the palpation, crepitus with rotation of the neck   Neurological:      Mental Status: He is alert and oriented to person, place, and time. Psychiatric:         Mood and Affect: Mood normal.       Lab Services on 02/01/2021   Component Date Value Ref Range Status   â¢ Fasting Status 02/01/2021 12  Hours Final   â¢ Sodium 02/01/2021 141  135 - 145 mmol/L Final   â¢ Potassium 02/01/2021 4.7  3.4 - 5.1 mmol/L Final   â¢ Chloride 02/01/2021 109* 98 - 107 mmol/L Final   â¢ Carbon Dioxide 02/01/2021 28  21 - 32 mmol/L Final   â¢ Anion Gap 02/01/2021 9* 10 - 20 mmol/L Final   â¢ Glucose 02/01/2021 194* 65 - 99 mg/dL Final   â¢ BUN 02/01/2021 20  6 - 20 mg/dL Final   â¢ Creatinine 02/01/2021 1.05  0.67 - 1.17 mg/dL Final   â¢ Glomerular Filtration Rate 02/01/2021 73* >90 mL/min/1.73m2 Final    eGFR 60 - 89 mL/min/1.73m2 = Mild decrease in kidney function.    â¢ BUN/ Creatinine Ratio 02/01/2021 19  7 - 25 Final   â¢ Calcium 02/01/2021 9.7  8.4 - 10.2 mg/dL Final   â¢ Bilirubin, Total 02/01/2021 0.3  0.2 - 1.0 mg/dL Final   â¢ GOT/AST 02/01/2021 15  <=37 Units/L Final   â¢ GPT/ALT 02/01/2021 25  <64 Units/L Final   â¢ Alkaline Phosphatase 02/01/2021 59  45 - 117 Units/L Final   â¢ Albumin 02/01/2021 3.6  3.6 - 5.1 g/dL Final   â¢ Protein, Total 02/01/2021 7.2  6.4 - 8.2 g/dL Final   â¢ Globulin 02/01/2021 3.6  2.0 - 4.0 g/dL Final   â¢ A/G Ratio 02/01/2021 1.0  1.0 - 2.4 Final   â¢ Fasting Status 02/01/2021 12  Hours Final   â¢ Cholesterol 02/01/2021 134  <=199 mg/dL Final    Desirable         <200  Borderline High   200 to 239  High              >=240   â¢ Triglycerides 02/01/2021 61  <=149 mg/dL Final    Normal            <150  Borderline High   150 to 199  High              200 to 499  Very High         >=500   â¢ HDL 02/01/2021 52  >=40 mg/dL Final    Low              <40  Borderline Low   40 to 49  Near Optimal     50 to 59  Optimal          >=60   â¢ LDL 02/01/2021 70  <=129 mg/dL Final    OPTIMAL           <100  NEAR OPTIMAL      100 to 129  BORDERLINE HIGH   130 to 159  HIGH              160 to 189  VERY HIGH         >=190   â¢ Non-HDL Cholesterol 02/01/2021 82  mg/dL Final    Therapeutic Target:  CHD and risk equivalents  <130  Multiple risk factors     <160  0 to 1 risk factor        <190   â¢ Cholesterol/ HDL Ratio 02/01/2021 2.6  <=4.4 Final   â¢ TSH 02/01/2021 2.155  0.350 - 5.000 mcUnits/mL Final   â¢ Hemoglobin A1C 02/01/2021 8.4* 4.5 - 5.6 % Final      Diabetic Screening  Non Diabetic:             <5.7%  Increased Risk:           5.7-6.4%  Diagnostic For Diabetes:  >6.4%    Diabetic Control  A1C%       eAG mg/dL  6.0            126  6.5            140  7.0            154  7.5            169  8.0            183  8.5            197  9.0            212  9.5            226  10.0           240   â¢ Microalbumin, Urine 02/01/2021 49.80  mg/dL Final   â¢ Creatinine, Urine 02/01/2021 249.00  mg/dL Final   â¢ Microalbumin/ Creatinine Ratio 02/01/2021 200.0* <=29.0 mg/g Final    Short term hyperglycemia, exercise, urinary tract infections, marked hypertension, heart failure, and acute febrile illness can cause transient elevations in urinary albumin excretion. Due to marked day-to-day variability in albumin excretion, at least two of the three collections done in a 3 to 6 month period should show elevated levels before initially designating a patient as having microalbuminuria.    â¢ Iron 02/01/2021 58* 65 - 175 mcg/dL Final   â¢ Iron Binding Capacity 02/01/2021 335  250 - 450 mcg/dL Final   â¢ Iron, Percent Saturation 02/01/2021 17  15 - 45 % Final   â¢ WBC 02/01/2021 6.1  4.2 - 11.0 K/mcL Final   â¢ RBC 02/01/2021 3.83* 4.50 - 5.90 mil/mcL Final   â¢ HGB 02/01/2021 12.0* 13.0 - 17.0 g/dL Final   â¢ HCT 02/01/2021 37.2* 39.0 - 51.0 % Final   â¢ MCV 02/01/2021 97.1  78.0 - 100.0 fl Final   â¢ MCH 02/01/2021 31.3  26.0 - 34.0 pg Final   â¢ MCHC 02/01/2021 32.3  32.0 - 36.5 g/dL Final â¢ RDW-CV 02/01/2021 13.2  11.0 - 15.0 % Final   â¢ RDW-SD 02/01/2021 46.6  39.0 - 50.0 fL Final   â¢ PLT 02/01/2021 200  140 - 450 K/mcL Final   â¢ NRBC 02/01/2021 0  <=0 /100 WBC Final   â¢ Neutrophil, Percent 02/01/2021 63  % Final   â¢ Lymphocytes, Percent 02/01/2021 26  % Final   â¢ Mono, Percent 02/01/2021 7  % Final   â¢ Eosinophils, Percent 02/01/2021 3  % Final   â¢ Basophils, Percent 02/01/2021 1  % Final   â¢ Immature Granulocytes 02/01/2021 0  % Final   â¢ Absolute Neutrophils 02/01/2021 3.8  1.8 - 7.7 K/mcL Final   â¢ Absolute Lymphocytes 02/01/2021 1.6  1.0 - 4.0 K/mcL Final   â¢ Absolute Monocytes 02/01/2021 0.4  0.3 - 0.9 K/mcL Final   â¢ Absolute Eosinophils  02/01/2021 0.2  0.0 - 0.5 K/mcL Final   â¢ Absolute Basophils 02/01/2021 0.0  0.0 - 0.3 K/mcL Final   â¢ Absolute Immmature Granulocytes 02/01/2021 0.0  0.0 - 0.2 K/mcL Final         ASSESSMENT AND PLAN:  Problem List Items Addressed This Visit        Endocrine    CKD stage 2 due to type 2 diabetes mellitus (CMS/[facility]) - Primary    Relevant Medications    pioglitazone (ACTOS) 45 MG tablet    dulaglutide (TRULICITY) 5.20 KH/6.1MI pen-injector      Other Visit Diagnoses     Type 2 diabetes mellitus with stage 2 chronic kidney disease, without long-term current use of insulin (CMS/[facility])        Relevant Medications    pioglitazone (ACTOS) 45 MG tablet    dulaglutide (TRULICITY) 1.90 RZ/0.3HM pen-injector    Need for vaccination        Relevant Orders    SERVICE TO [address] [place] [address]          Diabetes type 2 complicated by chronic kidney disease:  Discussion with patient today and explanation the blood group of medications that is available for diabetes control  Explained to the patient that he is already on 3 group of medications he developed side effects or intolerances to other groups  There is no more oral medications left  I recommended injectables  I discussed with patient good cardiovascular effect of Trulicity, it is noninsulin, so only once a week  Is it administered  Patient agrees to try it, think he has a friend who can help him with administration    Blood pressure looks well controlled    Covid vaccine just past and he agrees to have 1, sent to  to schedule    Recommend follow-up 2 months        Shasha Bermeo MD

## 2025-07-04 NOTE — Clinical Note
Angioplasty of the distal left anterior descending lesion. Inflation 1: Pressure = 12 tommie; Duration = 25 sec.

## 2025-07-04 NOTE — Clinical Note
Angioplasty of the distal left anterior descending lesion. Inflation 1: Pressure = 10 tommie; Duration = 12 sec. Inflation 2: Pressure = 12 tommie; Duration = 16 sec.

## 2025-07-04 NOTE — Clinical Note
Angioplasty of the middle left anterior descending lesion. Inflation 1: Pressure = 24 tommie; Duration = 16 sec. Inflation 2: Pressure = 24 tommie; Duration = 25 sec.

## 2025-07-04 NOTE — Clinical Note
Angioplasty of the distal left anterior descending lesion. Inflation 1: Pressure = 24 tommie; Duration = 25 sec. Inflation 2: Pressure = 24 tommie; Duration = 27 sec. Inflation 3: Pressure = 24 tommie; Duration = 15 sec.

## 2025-07-04 NOTE — Clinical Note
Closure device placed in the right radial artery. Site closed by radial compression system. Deployed By: Frankie [id number] air

## 2025-07-04 NOTE — ED TRIAGE NOTES
Pt presented to the ED with c/o SOB. Pt states for the past couple of weeks he was been SOB on exertion. Pt states today he has been SOB and had a pressure move across his chest from left to right. Pt now rates it as a 4/10 but was a 10/10 at its worse. Pt placed on cardiac monitor.    Home

## 2025-07-04 NOTE — Clinical Note
Vessel: LAD (diagonal). Guidewire removed. Original runthrough in D1 removed from behind struts and from body.

## 2025-07-04 NOTE — Clinical Note
Angioplasty of the middle left anterior descending lesion. Inflation 1: Pressure = 14 tommie; Duration = 25 sec.

## 2025-07-04 NOTE — Clinical Note
Angioplasty of the middle left anterior descending lesion. Inflation 1: Pressure = 20 tommie; Duration = 5 sec.

## 2025-07-04 NOTE — Clinical Note
Angioplasty of the left anterior descending diagonal lesion. Inflation 1: Pressure = 18 tommie; Duration = 12 sec.

## 2025-07-04 NOTE — Clinical Note
Vessel: LAD (diagonal). Guidewire inserted and used in a side branch. Wire into original D1 location.

## 2025-07-04 NOTE — ED PROVIDER NOTES
History/Exam limitations: none  HPI was provided by patient    HPI:    Chief Complaint   Patient presents with    Shortness of Breath        Brayan Barron Jr. is a 70 y.o. male presents with chief complaint of short of breath.  Past med history reviewed with patient.  States over the past couple weeks she has had short of breath on exertion.  Also had pressure across her chest from left to right.  States it feels like a 4 out of 10 but at times is 10 out of 10 at its worst. denies any recent travel recent trauma recent immobilization history of PE or DVT, unilateral leg swelling or recent surgery and not taking any hormones.  No history of connective tissue disorders or blood disorder.  States last time he had pain like this in his chest was from a heart attack and he had stent placed.       ROS:All other review of systems are negative except as noted above and HPI or ROS. (Bolded if positive)  CONSTITUTIONAL fever, chills.  EYES:      blurry vision, change in vision  ENT sore throat, congestion, rhinorrhea.  CARDIOVASCULAR palpitations, swelling, chest pain  RESPIRATORY cough, shortness of breath, wheeze  GI abdominal pain, nausea, vomiting, diarrhea.  GENITOURINARY dysuria, hematuria, frequency.  MUSCULOSKELETAL deformity, neck pain.  SKIN rash, color change.  NEUROLOGIC  headache, numbness, focal weakness.    Physical exam  General/Constitutional: Alert, oriented , cooperative,  in no acute distress.  Head: normocephalic, atraumatic  Skin: Intact,  dry skin, no lesions.  Eyes: PERRL, EOMs intact,  Conjunctiva pink with no erythema or exudates. No scleral icterus.   ENT: No external deformities. Nares patent, mucus membranes moist.  Pharynx clear, uvula midline.   Neck: Supple, without meningismus. Trachea at midline. No lymphadenopathy. No JVD  Pulmonary: Clear bilaterally. No crackles, rhonchi or wheezing.   Cardiac: Regular rate and regular rhythm.  Pulses 2+ throughout upper and lower extremities.  No gallop,  rub.  Ejection murmur  Abdomen: Soft, nontender, active bowel sounds.  No palpable organomegaly.  No rebound or guarding.  No CVA tenderness.  No pulsatile mass  Genitourinary: Exam deferred.  Musculoskeletal: Full range of motion, no deformity. Pulses full and equal. Non-edematous.  Neurological: Alert and oriented to person place and time.  no focal neuro deficits.  Sensation intact throughout.  Neurovascular intact in bilateral upper and lower extremities  Psychiatric: Appropriate mood and affect. Calm.       Past Medical History  He has a past medical history of Anxiety, CAD (coronary artery disease), native coronary artery, Colon polyps, Dupuytren contracture, Encounter for colorectal cancer screening (02/19/2025), GERD (gastroesophageal reflux disease), HLD (hyperlipidemia), HTN (hypertension), Moderate aortic valve stenosis (07/15/2024), and Vitamin D deficiency.    Surgical History  He has a past surgical history that includes XR knee (Right, 09/26/2008); XR knee (Left, 05/18/2015); Hand contracture release (Right, 01/10/2018); Cardiac catheterization (02/04/2022); Cataract extraction (Left, 02/02/2021); Cataract extraction (Right, 10/29/2019); and Colonoscopy (07/17/2015).     Social History  He reports that he has never smoked. He has never used smokeless tobacco. He reports current alcohol use. He reports that he does not use drugs.   Current Outpatient Medications   Medication Instructions    amLODIPine (NORVASC) 5 mg, oral, Daily    aspirin 81 mg EC tablet 1 tablet, Daily    BERBERINE CHLOR-SEAWEED-CHROM ORAL 1,200 mg, Daily    cholecalciferol (VITAMIN D-3) 50 mcg, oral, Daily    citalopram (CELEXA) 20 mg, oral, Daily    famotidine (PEPCID) 40 mg, oral, Daily    losartan (COZAAR) 100 mg, oral, Daily    rosuvastatin (CRESTOR) 10 mg, oral, Daily     RX Allergies[1]        ED Triage Vitals [07/04/25 1843]   Temperature Heart Rate Respirations BP   36.4 °C (97.5 °F) 84 18 180/86      Pulse Ox Temp Source  Heart Rate Source Patient Position   98 % Temporal Monitor Sitting      BP Location FiO2 (%)     -- --                 Labs and Imaging  XR chest 1 view    (Results Pending)     Labs Reviewed   CBC WITH AUTO DIFFERENTIAL - Abnormal       Result Value    WBC 10.2      nRBC 0.0      RBC 4.31 (*)     Hemoglobin 13.0 (*)     Hematocrit 39.9 (*)     MCV 93      MCH 30.2      MCHC 32.6      RDW 12.8      Platelets 260      Neutrophils % 59.7      Immature Granulocytes %, Automated 0.3      Lymphocytes % 22.1      Monocytes % 9.0      Eosinophils % 7.5      Basophils % 1.4      Neutrophils Absolute 6.12 (*)     Immature Granulocytes Absolute, Automated 0.03      Lymphocytes Absolute 2.26      Monocytes Absolute 0.92 (*)     Eosinophils Absolute 0.77 (*)     Basophils Absolute 0.14 (*)    BASIC METABOLIC PANEL - Abnormal    Glucose 96      Sodium 138      Potassium 4.5      Chloride 104      Bicarbonate 26      Anion Gap 13      Urea Nitrogen 29 (*)     Creatinine 1.58 (*)     eGFR 46 (*)     Calcium 9.6     B-TYPE NATRIURETIC PEPTIDE - Normal    BNP 18      Narrative:        <100 pg/mL - Heart failure unlikely  100-299 pg/mL - Intermediate probability of acute heart                  failure exacerbation. Correlate with clinical                  context and patient history.    >=300 pg/mL - Heart Failure likely. Correlate with clinical                  context and patient history.    BNP testing is performed using different testing methodology at [facility] than at other [facility]. Direct result comparisons should only be made within the same method.      INFLUENZA A AND B PCR - Normal    Flu A Result Not Detected      Flu B Result Not Detected      Narrative:     This assay is an in vitro diagnostic multiplex nucleic acid amplification test for the detection and discrimination of Influenza A & B from nasopharyngeal specimens, and has been validated for use at [facility].  Negative results do not preclude Influenza A/B infections, and should not be used as the sole basis for diagnosis, treatment, or other management decisions. If Influenza A/B and RSV PCR results are negative, testing for Parainfluenza virus, Adenovirus and Metapneumovirus is routinely performed for [facility] pediatric oncology and intensive care inpatients, and is available on other patients by placing an add-on request.   SARS-COV-2 PCR - Normal    Coronavirus 2019, PCR Not Detected      Narrative:     This assay is an FDA-cleared, in vitro diagnostic nucleic acid amplification test for the qualitative detection and differentiation of SARS CoV-2 from nasopharyngeal specimens collected from individuals with signs and symptoms of respiratory tract infections, and has been validated for use at [facility]. Negative results do not preclude COVID-19 infections and should not be used as the sole basis for diagnosis, treatment, or other management decisions. Testing for SARS CoV-2 is recommended only for patients who meet current clinical and/or epidemiological criteria defined by federal, state, or local public health directives.   MAGNESIUM - Normal    Magnesium 1.97     D-DIMER, VTE EXCLUSION - Normal    D-Dimer, Quantitative VTE Exclusion 478      Narrative:     The VTE Exclusion D-Dimer assay is reported in ng/mL Fibrinogen Equivalent Units (FEU).    Per 's instructions for use, a value of less than 500 ng/mL (FEU) may help to exclude DVT or PE in outpatients when the assay is used with a clinical pretest probability assessment.([facility] must utilize and document eCalc 'Wells Score Deep Vein Thrombosis Risk' for DVT exclusion only. Emergency Department should utilize  Guidelines for Emergency Department Use of the VTE Exclusion D-Dimer and Clinical Pretest probability assessment model for DVT or PE exclusion.)   SERIAL TROPONIN-INITIAL - Normal    Troponin I, High Sensitivity 3       Narrative:     Less than 99th percentile of normal range cutoff-  Female and children under 18 years old <14 ng/L; Male <21 ng/L: Negative  Repeat testing should be performed if clinically indicated.     Female and children under 18 years old 14-50 ng/L; Male 21-50 ng/L:  Consistent with possible cardiac damage and possible increased clinical   risk. Serial measurements may help to assess extent of myocardial damage.     >50 ng/L: Consistent with cardiac damage, increased clinical risk and  myocardial infarction. Serial measurements may help assess extent of   myocardial damage.      NOTE: Children less than 1 year old may have higher baseline troponin   levels and results should be interpreted in conjunction with the overall   clinical context.     NOTE: Troponin I testing is performed using a different   testing methodology at [facility] than at other   [facility]. Direct result comparisons should only   be made within the same method.   SERIAL TROPONIN, 1 HOUR - Normal    Troponin I, High Sensitivity 3      Narrative:     Less than 99th percentile of normal range cutoff-  Female and children under 18 years old <14 ng/L; Male <21 ng/L: Negative  Repeat testing should be performed if clinically indicated.     Female and children under 18 years old 14-50 ng/L; Male 21-50 ng/L:  Consistent with possible cardiac damage and possible increased clinical   risk. Serial measurements may help to assess extent of myocardial damage.     >50 ng/L: Consistent with cardiac damage, increased clinical risk and  myocardial infarction. Serial measurements may help assess extent of   myocardial damage.      NOTE: Children less than 1 year old may have higher baseline troponin   levels and results should be interpreted in conjunction with the overall   clinical context.     NOTE: Troponin I testing is performed using a different   testing methodology at [facility] than at other   [facility].  Direct result comparisons should only   be made within the same method.   TROPONIN SERIES- (INITIAL, 1 HR)    Narrative:     The following orders were created for panel order Troponin I Series, High Sensitivity (0, 1 HR).  Procedure                               Abnormality         Status                     ---------                               -----------         ------                     Troponin I, High Sensiti...[[id number]]  Normal              Final result               Troponin, High Sensitivi...[[id number]]  Normal              Final result                 Please view results for these tests on the individual orders.               Procedure  Procedures       Medical Decision Making:     External Records Reviewed: I reviewed recent and relevant outside records    ED Course as of 07/04/25 2144 Fri Jul 04, 2025 1917 EKG interpreted by me shows normal sinus rhythm no STEMI rate 67 compared to prior EKG similar findings present [WL]   2136 Spoke with Dr. Pino who agrees on admission [WL]   2143 On reevaluation had complete resolution of the pain with the nitro given in the full dose of aspirin. [WL]      ED Course User Index  [WL] King Spencer DO         Diagnoses as of 07/04/25 2144   Shortness of breath   Chest pain, unspecified type         XR chest 1 view    (Results Pending)     Labs Reviewed   CBC WITH AUTO DIFFERENTIAL - Abnormal       Result Value    WBC 10.2      nRBC 0.0      RBC 4.31 (*)     Hemoglobin 13.0 (*)     Hematocrit 39.9 (*)     MCV 93      MCH 30.2      MCHC 32.6      RDW 12.8      Platelets 260      Neutrophils % 59.7      Immature Granulocytes %, Automated 0.3      Lymphocytes % 22.1      Monocytes % 9.0      Eosinophils % 7.5      Basophils % 1.4      Neutrophils Absolute 6.12 (*)     Immature Granulocytes Absolute, Automated 0.03      Lymphocytes Absolute 2.26      Monocytes Absolute 0.92 (*)     Eosinophils Absolute 0.77 (*)     Basophils Absolute 0.14 (*)    BASIC  METABOLIC PANEL - Abnormal    Glucose 96      Sodium 138      Potassium 4.5      Chloride 104      Bicarbonate 26      Anion Gap 13      Urea Nitrogen 29 (*)     Creatinine 1.58 (*)     eGFR 46 (*)     Calcium 9.6     B-TYPE NATRIURETIC PEPTIDE - Normal    BNP 18      Narrative:        <100 pg/mL - Heart failure unlikely  100-299 pg/mL - Intermediate probability of acute heart                  failure exacerbation. Correlate with clinical                  context and patient history.    >=300 pg/mL - Heart Failure likely. Correlate with clinical                  context and patient history.    BNP testing is performed using different testing methodology at [facility] than at [facility]. Direct result comparisons should only be made within the same method.      INFLUENZA A AND B PCR - Normal    Flu A Result Not Detected      Flu B Result Not Detected      Narrative:     This assay is an in vitro diagnostic multiplex nucleic acid amplification test for the detection and discrimination of Influenza A & B from nasopharyngeal specimens, and has been validated for use at [facility]. Negative results do not preclude Influenza A/B infections, and should not be used as the sole basis for diagnosis, treatment, or other management decisions. If Influenza A/B and RSV PCR results are negative, testing for Parainfluenza virus, Adenovirus and Metapneumovirus is routinely performed for [facility] pediatric oncology and intensive care inpatients, and is available on other patients by placing an add-on request.   SARS-COV-2 PCR - Normal    Coronavirus 2019, PCR Not Detected      Narrative:     This assay is an FDA-cleared, in vitro diagnostic nucleic acid amplification test for the qualitative detection and differentiation of SARS CoV-2 from nasopharyngeal specimens collected from individuals with signs and symptoms of respiratory tract infections, and has been validated for use at  [facility]. Negative results do not preclude COVID-19 infections and should not be used as the sole basis for diagnosis, treatment, or other management decisions. Testing for SARS CoV-2 is recommended only for patients who meet current clinical and/or epidemiological criteria defined by federal, state, or local public health directives.   MAGNESIUM - Normal    Magnesium 1.97     D-DIMER, VTE EXCLUSION - Normal    D-Dimer, Quantitative VTE Exclusion 478      Narrative:     The VTE Exclusion D-Dimer assay is reported in ng/mL Fibrinogen Equivalent Units (FEU).    Per 's instructions for use, a value of less than 500 ng/mL (FEU) may help to exclude DVT or PE in outpatients when the assay is used with a clinical pretest probability assessment.(AE must utilize and document eCalc 'Wells Score Deep Vein Thrombosis Risk' for DVT exclusion only. Emergency Department should utilize  Guidelines for Emergency Department Use of the VTE Exclusion D-Dimer and Clinical Pretest probability assessment model for DVT or PE exclusion.)   SERIAL TROPONIN-INITIAL - Normal    Troponin I, High Sensitivity 3      Narrative:     Less than 99th percentile of normal range cutoff-  Female and children under 18 years old <14 ng/L; Male <21 ng/L: Negative  Repeat testing should be performed if clinically indicated.     Female and children under 18 years old 14-50 ng/L; Male 21-50 ng/L:  Consistent with possible cardiac damage and possible increased clinical   risk. Serial measurements may help to assess extent of myocardial damage.     >50 ng/L: Consistent with cardiac damage, increased clinical risk and  myocardial infarction. Serial measurements may help assess extent of   myocardial damage.      NOTE: Children less than 1 year old may have higher baseline troponin   levels and results should be interpreted in conjunction with the overall   clinical context.     NOTE: Troponin I testing is performed using  a different   testing methodology at [facility] than at [facility]. Direct result comparisons should only   be made within the same method.   SERIAL TROPONIN, 1 HOUR - Normal    Troponin I, High Sensitivity 3      Narrative:     Less than 99th percentile of normal range cutoff-  Female and children under 18 years old <14 ng/L; Male <21 ng/L: Negative  Repeat testing should be performed if clinically indicated.     Female and children under 18 years old 14-50 ng/L; Male 21-50 ng/L:  Consistent with possible cardiac damage and possible increased clinical   risk. Serial measurements may help to assess extent of myocardial damage.     >50 ng/L: Consistent with cardiac damage, increased clinical risk and  myocardial infarction. Serial measurements may help assess extent of   myocardial damage.      NOTE: Children less than 1 year old may have higher baseline troponin   levels and results should be interpreted in conjunction with the overall   clinical context.     NOTE: Troponin I testing is performed using a different   testing methodology at [facility] than at [facility]. Direct result comparisons should only   be made within the same method.   TROPONIN SERIES- (INITIAL, 1 HR)    Narrative:     The following orders were created for panel order Troponin I Series, High Sensitivity (0, 1 HR).  Procedure                               Abnormality         Status                     ---------                               -----------         ------                     Troponin I, High Sensiti...[[id number]]  Normal              Final result               Troponin, High Sensitivi...[[id number]]  Normal              Final result                 Please view results for these tests on the individual orders.       The patient presented for evaluation of chest pain.    Differential included but not limited to ACS, arrhythmia, pneumothorax, dissection, aneurysm, electrolyte  abnormality, musculoskeletal pain, PE.  Imaging studies if performed were independently reviewed and interpreted by myself and confirmed by radiologist.   Patient is a medium to high risk heart score requires continued workup and management of their symptoms and will be admitted to the hospital for further evaluation and treatment.  Plan be to admit to medicine.     I discussed the differential, results and plan with the patient and/or family/friend/caregiver if present.    Note: This note was dictated by speech recognition. Minor errors in transcription may be present.             [1]   Allergies  Allergen Reactions    Codeine Swapnil Spencer DO  07/04/25 2144

## 2025-07-05 ENCOUNTER — APPOINTMENT (OUTPATIENT)
Dept: CARDIOLOGY | Facility: HOSPITAL | Age: 70
DRG: 322 | End: 2025-07-05
Payer: MEDICARE

## 2025-07-05 PROBLEM — R06.02 SHORTNESS OF BREATH: Status: ACTIVE | Noted: 2025-07-05

## 2025-07-05 LAB
ALBUMIN SERPL BCP-MCNC: 4 G/DL (ref 3.4–5)
ANION GAP SERPL CALC-SCNC: 11 MMOL/L (ref 10–20)
BUN SERPL-MCNC: 30 MG/DL (ref 6–23)
CALCIUM SERPL-MCNC: 9.5 MG/DL (ref 8.6–10.3)
CHLORIDE SERPL-SCNC: 104 MMOL/L (ref 98–107)
CO2 SERPL-SCNC: 27 MMOL/L (ref 21–32)
CREAT SERPL-MCNC: 1.28 MG/DL (ref 0.5–1.3)
EGFRCR SERPLBLD CKD-EPI 2021: 59 ML/MIN/1.73M*2
ERYTHROCYTE [DISTWIDTH] IN BLOOD BY AUTOMATED COUNT: 12.7 % (ref 11.5–14.5)
EST. AVERAGE GLUCOSE BLD GHB EST-MCNC: 117 MG/DL
GLUCOSE SERPL-MCNC: 96 MG/DL (ref 74–99)
HBA1C MFR BLD: 5.7 % (ref ?–5.7)
HCT VFR BLD AUTO: 40.9 % (ref 41–52)
HGB BLD-MCNC: 13.6 G/DL (ref 13.5–17.5)
MAGNESIUM SERPL-MCNC: 1.9 MG/DL (ref 1.6–2.4)
MCH RBC QN AUTO: 31 PG (ref 26–34)
MCHC RBC AUTO-ENTMCNC: 33.3 G/DL (ref 32–36)
MCV RBC AUTO: 93 FL (ref 80–100)
NRBC BLD-RTO: 0 /100 WBCS (ref 0–0)
PHOSPHATE SERPL-MCNC: 3.1 MG/DL (ref 2.5–4.9)
PLATELET # BLD AUTO: 251 X10*3/UL (ref 150–450)
POTASSIUM SERPL-SCNC: 4.4 MMOL/L (ref 3.5–5.3)
RBC # BLD AUTO: 4.39 X10*6/UL (ref 4.5–5.9)
SODIUM SERPL-SCNC: 138 MMOL/L (ref 136–145)
T4 FREE SERPL-MCNC: 0.82 NG/DL (ref 0.61–1.12)
TSH SERPL-ACNC: 2.12 MIU/L (ref 0.44–3.98)
WBC # BLD AUTO: 9.1 X10*3/UL (ref 4.4–11.3)

## 2025-07-05 PROCEDURE — 84132 ASSAY OF SERUM POTASSIUM: CPT

## 2025-07-05 PROCEDURE — 83735 ASSAY OF MAGNESIUM: CPT

## 2025-07-05 PROCEDURE — 2500000001 HC RX 250 WO HCPCS SELF ADMINISTERED DRUGS (ALT 637 FOR MEDICARE OP)

## 2025-07-05 PROCEDURE — 99222 1ST HOSP IP/OBS MODERATE 55: CPT | Performed by: NURSE PRACTITIONER

## 2025-07-05 PROCEDURE — 1200000002 HC GENERAL ROOM WITH TELEMETRY DAILY

## 2025-07-05 PROCEDURE — 99223 1ST HOSP IP/OBS HIGH 75: CPT | Performed by: INTERNAL MEDICINE

## 2025-07-05 PROCEDURE — 2500000004 HC RX 250 GENERAL PHARMACY W/ HCPCS (ALT 636 FOR OP/ED)

## 2025-07-05 PROCEDURE — 2500000002 HC RX 250 W HCPCS SELF ADMINISTERED DRUGS (ALT 637 FOR MEDICARE OP, ALT 636 FOR OP/ED)

## 2025-07-05 PROCEDURE — 93005 ELECTROCARDIOGRAM TRACING: CPT

## 2025-07-05 PROCEDURE — 96372 THER/PROPH/DIAG INJ SC/IM: CPT

## 2025-07-05 PROCEDURE — 36415 COLL VENOUS BLD VENIPUNCTURE: CPT

## 2025-07-05 PROCEDURE — 85027 COMPLETE CBC AUTOMATED: CPT

## 2025-07-05 RX ORDER — ALUMINUM HYDROXIDE, MAGNESIUM HYDROXIDE, AND SIMETHICONE 1200; 120; 1200 MG/30ML; MG/30ML; MG/30ML
10 SUSPENSION ORAL 2 TIMES DAILY
Status: DISPENSED | OUTPATIENT
Start: 2025-07-05

## 2025-07-05 RX ORDER — NITROGLYCERIN 0.4 MG/1
0.4 TABLET SUBLINGUAL EVERY 5 MIN PRN
Status: ACTIVE | OUTPATIENT
Start: 2025-07-05

## 2025-07-05 RX ORDER — ROSUVASTATIN CALCIUM 10 MG/1
10 TABLET, COATED ORAL DAILY
Status: DISPENSED | OUTPATIENT
Start: 2025-07-05

## 2025-07-05 RX ORDER — ISOSORBIDE MONONITRATE 30 MG/1
30 TABLET, EXTENDED RELEASE ORAL DAILY
Status: DISPENSED | OUTPATIENT
Start: 2025-07-05

## 2025-07-05 RX ORDER — ENOXAPARIN SODIUM 100 MG/ML
40 INJECTION SUBCUTANEOUS EVERY 24 HOURS
Status: DISPENSED | OUTPATIENT
Start: 2025-07-06

## 2025-07-05 RX ADMIN — ROSUVASTATIN CALCIUM 10 MG: 10 TABLET, FILM COATED ORAL at 08:56

## 2025-07-05 RX ADMIN — ENOXAPARIN SODIUM 40 MG: 100 INJECTION SUBCUTANEOUS at 08:56

## 2025-07-05 RX ADMIN — AMLODIPINE BESYLATE 5 MG: 5 TABLET ORAL at 08:56

## 2025-07-05 RX ADMIN — CITALOPRAM HYDROBROMIDE 20 MG: 20 TABLET ORAL at 08:56

## 2025-07-05 RX ADMIN — ISOSORBIDE MONONITRATE 30 MG: 30 TABLET, EXTENDED RELEASE ORAL at 20:28

## 2025-07-05 RX ADMIN — ALUMINUM HYDROXIDE, MAGNESIUM HYDROXIDE, AND DIMETHICONE 10 ML: 200; 20; 200 SUSPENSION ORAL at 20:28

## 2025-07-05 RX ADMIN — FAMOTIDINE 40 MG: 20 TABLET, FILM COATED ORAL at 08:56

## 2025-07-05 RX ADMIN — LOSARTAN POTASSIUM 50 MG: 50 TABLET, FILM COATED ORAL at 08:56

## 2025-07-05 RX ADMIN — ASPIRIN 81 MG: 81 TABLET, DELAYED RELEASE ORAL at 08:56

## 2025-07-05 RX ADMIN — ALUMINUM HYDROXIDE, MAGNESIUM HYDROXIDE, AND DIMETHICONE 10 ML: 200; 20; 200 SUSPENSION ORAL at 11:27

## 2025-07-05 ASSESSMENT — ENCOUNTER SYMPTOMS
SHORTNESS OF BREATH: 1
CHEST TIGHTNESS: 1

## 2025-07-05 ASSESSMENT — COGNITIVE AND FUNCTIONAL STATUS - GENERAL
DAILY ACTIVITIY SCORE: 24
MOBILITY SCORE: 24

## 2025-07-05 ASSESSMENT — PAIN SCALES - GENERAL
PAINLEVEL_OUTOF10: 4
PAINLEVEL_OUTOF10: 0 - NO PAIN

## 2025-07-05 ASSESSMENT — PAIN DESCRIPTION - DESCRIPTORS: DESCRIPTORS: PRESSURE

## 2025-07-05 ASSESSMENT — PAIN - FUNCTIONAL ASSESSMENT: PAIN_FUNCTIONAL_ASSESSMENT: 0-10

## 2025-07-05 NOTE — CONSULTS
Inpatient consult to Cardiology  Consult performed by: ZEENAT Hawley-CNP  Consult ordered by: Napoleon Luis MD        History Of Present Illness:    Brayan Barron Jr. is a 70 y.o. male with PMH of CAD s/p PCI with LIZANDRO to dRCA and PTCA of rPLV sidebranch (2/2022), moderate AS (TTE 1/2025), HTN, HLD, GERD, CKD (BL creat 1.3), anxiety presenting with exertional dyspnea and chest pain. He has has worsening shortness of breath with exertion with occasional chest tightness for the past 4-6 weeks. This is typically relieved at rest. Yesterday, he noticed a bilateral squeezing pain in his chest that radiated to the middle of his chest at rest. He rated this pain as an 8/10. When he took his nitroglycerin in the ED, he says the sensation worsened, but then it was relieved. He currently has intermittent mild, pain in his left chest.     BP on presentation 180/86. Lab work shows creatinine 1.58, eGFR 46, BNP 18, troponin 3, 3. CXR without acute process. EKG shows NSR HR 64 bpm.     He follows with Ruth Lutz. Home medications include rosuvastatin 10 mg daily, losartan 50 mg daily, amlodipine 5 mg daily, aspirin 81 mg daily.     Review of Systems   Respiratory:  Positive for chest tightness and shortness of breath.    Cardiovascular:  Positive for chest pain.   All other systems reviewed and are negative.    Last Recorded Vitals:  Vitals:    07/04/25 2100 07/04/25 2200 07/04/25 2255 07/05/25 0335   BP: 141/73 154/90 160/81 115/59   BP Location: Left arm Left arm Left arm Right arm   Patient Position: Sitting Sitting     Pulse: 64 61 61 60   Resp: 16 13 18 18   Temp:   36.6 °C (97.9 °F) 36.5 °C (97.7 °F)   TempSrc:   Temporal Temporal   SpO2: 96% 97% 97% 96%   Weight:       Height:           Last Labs:  CBC - 7/5/2025:  5:40 AM  9.1 13.6 251    40.9      CMP - 7/5/2025:  5:40 AM  9.5 7.9 15 --- 0.6   3.1 4.0 14 72      PTT - No results in last year.  _   _ _     Troponin I, High Sensitivity   Date/Time Value Ref  Range Status   07/04/2025 08:35 PM 3 0 - 20 ng/L Final   07/04/2025 07:23 PM 3 0 - 20 ng/L Final     BNP   Date/Time Value Ref Range Status   07/04/2025 07:23 PM 18 0 - 99 pg/mL Final     Hemoglobin A1C   Date/Time Value Ref Range Status   01/24/2025 07:30 AM 5.5 See comment % Final   05/20/2024 11:50 AM 5.4 see below % Final     LDL Calculated   Date/Time Value Ref Range Status   07/04/2025 08:35 PM 54 <=99 mg/dL Final     Comment:                                 Near   Borderline      AGE      Desirable  Optimal    High     High     Very High     0-19 Y     0 - 109     ---    110-129   >/= 130     ----    20-24 Y     0 - 119     ---    120-159   >/= 160     ----      >24 Y     0 -  99   100-129  130-159   160-189     >/=190    LDL Cholesterol is calculated using the Friedewald equation.   01/24/2025 07:30 AM 77 <=99 mg/dL Final     Comment:                                 Near   Borderline      AGE      Desirable  Optimal    High     High     Very High     0-19 Y     0 - 109     ---    110-129   >/= 130     ----    20-24 Y     0 - 119     ---    120-159   >/= 160     ----      >24 Y     0 -  99   100-129  130-159   160-189     >/=190     05/20/2024 11:50 AM 76 <=99 mg/dL Final     Comment:                                 Near   Borderline      AGE      Desirable  Optimal    High     High     Very High     0-19 Y     0 - 109     ---    110-129   >/= 130     ----    20-24 Y     0 - 119     ---    120-159   >/= 160     ----      >24 Y     0 -  99   100-129  130-159   160-189     >/=190       VLDL   Date/Time Value Ref Range Status   07/04/2025 08:35 PM 34 0 - 40 mg/dL Final   01/24/2025 07:30 AM 27 0 - 40 mg/dL Final   05/20/2024 11:50 AM 30 0 - 40 mg/dL Final      Last I/O:  No intake/output data recorded.    Past Cardiology Tests (Last 3 Years):  Echo:  Transthoracic Echo (TTE) Complete 01/15/2025  CONCLUSIONS:   1. The left ventricular systolic function is normal, with a visually estimated ejection fraction of  55-60%.   2. Spectral Doppler shows a Grade I (impaired relaxation pattern) of left ventricular diastolic filling with normal left atrial filling pressure.   3. There is normal right ventricular global systolic function.   4. Moderate aortic valve stenosis. The aortic valve dimensionless index is 0.33. There is no evidence of aortic valve regurgitation. The peak instantaneous gradient of the aortic valve is 27 mmHg. The mean gradient of the aortic valve is 16 mmHg.    AORTIC VALVE:                      Normal Ranges:  AoV Vmax:                2.58 m/s  (<=1.7m/s)  AoV Peak P.6 mmHg (<20mmHg)  AoV Mean P.0 mmHg (1.7-11.5mmHg)  LVOT Max Henry:            0.91 m/s  (<=1.1m/s)  AoV VTI:                 60.40 cm  (18-25cm)  LVOT VTI:                20.20 cm  LVOT Diameter:           2.00 cm   (1.8-2.4cm)  AoV Area, VTI:           1.05 cm2  (2.5-5.5cm2)  AoV Area,Vmax:           1.11 cm2  (2.5-4.5cm2)  AoV Dimensionless Index: 0.33    Ejection Fractions:  EF   Date/Time Value Ref Range Status   01/15/2025 08:45 AM 58 %      Cath:  [facility]  2022  1. Left main: mild irregularities.  2. LAD: 30% mid-vessel disease, 50% ostial D1.  3. LCx: no significant angiographic disease.  4. RCA: 90% distal stenosis, 90% ostial rPDA stenosis, 90% ostial rPDA disease ([place] 1,1,1).  5. LVEDP 12mmHg, no aortic stenosis on LV-Ao gradient.  6. Successful PCI to severe distal RCA bifurcation stenosis ([place] 1,1,1) with one Synergy LIZANDRO with PTCA of rPLV sidebranch.    Stress Test:  Nuclear Stress Test 2023  1. Myocardial perfusion study without evidence of stress-induced  ischemia.  2. No evidence of myocardial infarction.  3. The left ventricle is normal in size.  4. Normal LV wall motion with an LV EF estimated at greater than 65%.    Cardiac Imaging:  No results found for this or any previous visit from the past 1095 days.      Past Medical History:  He has a past medical history of Anxiety, CAD  (coronary artery disease), native coronary artery, Colon polyps, Dupuytren contracture, Encounter for colorectal cancer screening (02/19/2025), GERD (gastroesophageal reflux disease), HLD (hyperlipidemia), HTN (hypertension), Moderate aortic valve stenosis (07/15/2024), and Vitamin D deficiency.    Past Surgical History:  He has a past surgical history that includes XR knee (Right, 09/26/2008); XR knee (Left, 05/18/2015); Hand contracture release (Right, 01/10/2018); Cardiac catheterization (02/04/2022); Cataract extraction (Left, 02/02/2021); Cataract extraction (Right, 10/29/2019); and Colonoscopy (07/17/2015).      Social History:  He reports that he has never smoked. He has never used smokeless tobacco. He reports current alcohol use. He reports that he does not use drugs.    Family History:  Family History[1]     Allergies:  Codeine    Inpatient Medications:  Scheduled Medications[2]  PRN Medications[3]  Continuous Medications[4]  Outpatient Medications:  Current Outpatient Medications   Medication Instructions    amLODIPine (NORVASC) 5 mg, oral, Daily    aspirin 81 mg EC tablet 1 tablet, Daily    BERBERINE CHLOR-SEAWEED-CHROM ORAL 1,200 mg, Daily    cholecalciferol (VITAMIN D-3) 50 mcg, oral, Daily    citalopram (CELEXA) 20 mg, oral, Daily    famotidine (PEPCID) 40 mg, oral, Daily    losartan (COZAAR) 100 mg, oral, Daily    rosuvastatin (CRESTOR) 10 mg, oral, Daily     Physical Exam  Vitals reviewed.   Constitutional:       Appearance: Normal appearance. He is normal weight.   HENT:      Head: Normocephalic and atraumatic.   Neck:      Vascular: No carotid bruit or JVD.   Cardiovascular:      Rate and Rhythm: Normal rate and regular rhythm.      Pulses: Normal pulses.      Heart sounds: Murmur heard.      Systolic murmur is present with a grade of 3/6.   Pulmonary:      Effort: Pulmonary effort is normal.      Breath sounds: Normal breath sounds.   Chest:      Chest wall: No tenderness.   Abdominal:       General: Abdomen is flat. Bowel sounds are normal.      Palpations: Abdomen is soft.   Skin:     General: Skin is warm and dry.   Neurological:      General: No focal deficit present.      Mental Status: He is alert and oriented to person, place, and time. Mental status is at baseline.   Psychiatric:         Mood and Affect: Mood normal.         Behavior: Behavior normal.            Assessment/Plan   Assessment  70 y.o. male with PMH of CAD s/p PCI with LIZANDRO to  dRCA and PTCA of rPLV sidebranch (2/2022), moderate AS (TTE 1/2025), HTN, HLD, GERD, CKD (BL creat 1.3), anxiety presenting with progressive exertional dyspnea and chest pain. Lab work shows creatinine 1.58, eGFR 46, BNP 18, troponin 3, 3. CXR without acute process. EKG shows NSR HR 64 bpm.     Unstable angina  CAD s/p PCI with LIZANDRO to dRCA and PTCA of rPLV sidebranch (2/2022)   Moderate AS (TTE 1/2025)  Essential HTN  HLD     Plan  -TTE for structural and functional assessment  -Plan for [facility] Monday  -Begin Imdur 30 mg daily  -Continue aspirin 81 mg daily, rosuvastatin 10 mg daily  -Continue amlodipine 5 mg daily, losartan 50 mg daily  -Will follow    Peripheral IV 07/04/25 20 G Right Antecubital (Active)   Site Assessment Clean;Dry;Intact 07/04/25 2343   Dressing Status Clean;Dry 07/04/25 2343   Number of days: 1       Code Status:  Full Code          ZEENAT Hawley-CNP         [1] No family history on file.  [2]   Scheduled medications   Medication Dose Route Frequency    amLODIPine  5 mg oral Daily    aspirin  81 mg oral Daily    citalopram  20 mg oral Daily    enoxaparin  40 mg subcutaneous q24h    famotidine  40 mg oral Daily    losartan  50 mg oral Daily    rosuvastatin  10 mg oral Daily   [3]   PRN medications   Medication    nitroglycerin   [4]   Continuous Medications   Medication Dose Last Rate

## 2025-07-05 NOTE — HOSPITAL COURSE
Brief HPI:  Brayan Barron Jr. is a 70 y.o. male with a PMH of HTN, HLD, CAD w/ PCI to RCA in 2/2022, aortic stenosis, GERD, CKD, and anxiety who presented to the hospital for shortness of breath on exertion and chest pain.     ED Course:  In ED, patient had elevated blood pressure at 180/86 mmHg.  Otherwise vitally stable.  Labs unremarkable.  At his baseline creatinine of 1.58.  D-dimer 478.  Troponin negative at 3 --> 3 . Chest x-ray done showed no acute pulmonary abnormality.  Patient was loaded with aspirin and given nitroglycerin For his chest pain and subsequently transferred to the floors    Floor course:  On floors, cardiology was consulted.   EKG- normal, Troponin- normal. BNP- normal.   Given his elevated heart score of 5, there was plan to take the patient for left heart cath on 07/07.  NPO past mid night. Echo was ordered,

## 2025-07-05 NOTE — PROGRESS NOTES
Department of Hospital Medicine  Daily Progress Note   Hospital Day: 2       Name:Brayan Barron Jr., AGE: 70 y.o., GENDER: male, MRN: [id number], ROOM: 222/[facility]   CODE STATUS: Full Code  Attending Physician: Napoleon Luis MD  Resident: Ruslan Perry MD        Chief Complaint     Chief Complaint   Patient presents with    Shortness of Breath        Interval History   Brayan Barron Jr. is a 70 y.o. year old male patient on Hospital Day: 2    Overnight events:  No acute events overnight  Seen and examined at bedside this morning.  Patient had no active/acute complaints, his most recent chest tightness/discomfort episode was at around 6 AM which lasted an hour.  Had extensive discussion with patient in regards to his symptoms.  Patient did endorse his symptoms were similar to the ones that he was having back in 2022.  Also endorsed that the chest discomfort/tightness is present and rated at at 3/10 consistently with acute exacerbation.     Subjective    Meds    Scheduled medications  Scheduled Medications[1]  Continuous medications  Continuous Medications[2]  PRN medications  PRN Medications[3]     Objective    Exam   Physical Exam  Constitutional:       Appearance: Normal appearance.   HENT:      Head: Normocephalic and atraumatic.      Mouth/Throat:      Mouth: Mucous membranes are moist.   Cardiovascular:      Rate and Rhythm: Normal rate and regular rhythm.      Heart sounds: Murmur heard.      No friction rub. No gallop.   Pulmonary:      Breath sounds: Normal breath sounds. No stridor. No wheezing or rhonchi.   Abdominal:      General: Abdomen is flat. Bowel sounds are normal.      Palpations: Abdomen is soft. There is no mass.      Tenderness: There is no abdominal tenderness. There is no guarding.   Musculoskeletal:         General: Swelling present. No tenderness. Normal range of motion.      Cervical back: Normal range of motion.   Neurological:      General: No focal deficit present.      Mental Status: He  is alert and oriented to person, place, and time.   Psychiatric:         Mood and Affect: Mood normal.         Behavior: Behavior normal.          Vitals         7/4/2025     7:27 PM 7/4/2025     8:00 PM 7/4/2025     9:00 PM 7/4/2025    10:00 PM 7/4/2025    10:55 PM 7/5/2025     3:35 AM 7/5/2025     8:12 AM   Vitals   Systolic 170 142 141 154 160 115 139   Diastolic 86 77 73 90 81 59 76   BP Location Left arm Left arm Left arm Left arm Left arm Right arm Left arm   Heart Rate 67 64 64 61 61 60 67   Temp     36.6 °C (97.9 °F) 36.5 °C (97.7 °F) 36.6 °C (97.9 °F)   Resp 17 15 16 13 18 18         Intake/Output Summary (Last 24 hours) at 7/5/2025 1622  Last data filed at 7/5/2025 1208  Gross per 24 hour   Intake 480 ml   Output --   Net 480 ml       Labs   Labs:   Results from last 72 hours   Lab Units 07/05/25  0540 07/04/25  1923   SODIUM mmol/L 138 138   POTASSIUM mmol/L 4.4 4.5   CHLORIDE mmol/L 104 104   CO2 mmol/L 27 26   BUN mg/dL 30* 29*   CREATININE mg/dL 1.28 1.58*   GLUCOSE mg/dL 96 96   CALCIUM mg/dL 9.5 9.6   ANION GAP mmol/L 11 13   EGFR mL/min/1.73m*2 59* 46*   PHOSPHORUS mg/dL 3.1  --       Results from last 72 hours   Lab Units 07/05/25  0540 07/04/25  1923   WBC AUTO x10*3/uL 9.1 10.2   HEMOGLOBIN g/dL 13.6 13.0*   HEMATOCRIT % 40.9* 39.9*   PLATELETS AUTO x10*3/uL 251 260   NEUTROS PCT AUTO %  --  59.7   LYMPHS PCT AUTO %  --  22.1   MONOS PCT AUTO %  --  9.0   EOS PCT AUTO %  --  7.5      Lab Results   Component Value Date    CALCIUM 9.5 07/05/2025    PHOS 3.1 07/05/2025        Images    Imaging  XR chest 1 view  Result Date: 7/4/2025  No acute pulmonary abnormality. Signed by William Martins MD      Cardiology, Vascular, and Other Imaging  No other imaging results found for the past 7 days      Assessment    Assessment and Plan    Brayan Barron Jr. is a 70 y.o. male admitted on 7/4/2025 with dyspnea on exertion and chest pain.  Has a a past medical history significant for HTN, HLD, CAD status post PCI  to RCA and PTCA of rPLV sidebranch.  Cardiology on board.  Patient to get cardiac cath on 07/07    Updates 07/05/25  :  Spoke with cardiology, plan to take patient for left heart cath on Monday  Will start patient on Maalox to help with the acid reflux being a possible cause for chest discomfort    ACUTE MEDICAL ISSUES:  # Unstable angina  # CAD s/p PCI with LIZANDRO to dRCA and PTCA of rPLV sidebranch  # Aortic Stenosis  # HTN  # HFpEF  - SX: Presented with dyspnea on exertion with associated chest pain.  Upon admission had chest pain similar to the one he had in 2022  - Labs 07/04: troponins negative at 3 --> 3.  Lipid panel with elevated triglycerides at 168, normal cholesterol 122, LDL 54, VLDL 34, HDL 34.8.  BNP 18 (WNL)  - Imaging done included chest x-ray which showed no acute pulmonary abnormality.  Cardiomediastinal silhouette is normal in size and configuration  - Reviewed echo from 01/15/2025: LVEF 55 to 60%, spectral Doppler shows a grade 1 of LV diastolic filling with normal LA filling pressure.  Normal RV global systolic function.  Moderate AV stenosis  - Patient s/p aspirin loading and nitroglycerin in ED   : : HEART score 5   [ ] HbA1c ordered; pending  [ ] TTE ordered; pending  PLAN:  - Cardiology consulted  - Plan for patient to get left heart cath on Monday 07/07 --> n.p.o. orders placed for Sunday night  - Continue amlodipine 5 mg every day  - Continue aspirin 81 mg every day  - Continue losartan 50 mg every day    CHRONIC MEDICAL ISSUES:  # HLD: Continue rosuvastatin 10 mg every day  # MDD/MIMA: Continue citalopram 20 mg every day  # GERD: Continue famotidine 40 mg every day.  Will start patient on Mylanta 10 cc twice a day to rule out any GI causes for the chest discomfort    Fluids: Bolus as needed  Electrolytes: Replete as needed  Nutrition: Adult regular diet  Antimicrobials: None indicated  DVT ppx: Lovenox  GI ppx: Pantoprazole, Mylanta  Catheter: None  Lines: 20-gauge PIV in right  antecubital  Supplemental Oxygen: On room air  Emergency Contact: Extended Emergency Contact Information  Primary Emergency Contact: Christina Barron  Home Phone: [phone]  Relation: Spouse  Secondary Emergency Contact: Gloria Pearce  Mobile Phone: [phone]  Relation: Daughter  Preferred language: English   needed? No   Code: Full Code     Disposition: Discharge pending cardiology clearance.  Likely discharge Monday after left heart cath    Ruslan Perry MD  Internal Medicine, PGY- 3  07/05/25 at 4:22 PM                [1] alum-mag hydroxide-simeth, 10 mL, oral, BID  amLODIPine, 5 mg, oral, Daily  aspirin, 81 mg, oral, Daily  citalopram, 20 mg, oral, Daily  [START ON 7/6/2025] enoxaparin, 40 mg, subcutaneous, q24h  famotidine, 40 mg, oral, Daily  losartan, 50 mg, oral, Daily  rosuvastatin, 10 mg, oral, Daily  [2]    [3] PRN medications: nitroglycerin

## 2025-07-05 NOTE — H&P
HPI    Brayan Barron Jr. is a 70 y.o. male with a PMH of HTN, HLD, CAD w/ PCI to RCA in 2/2022, aortic stenosis, GERD, CKD, and anxiety who presented to the hospital for shortness of breath on exertion and chest pain. Patient states that for the past 4-6 weeks, he's been noticing some significant difficulty with yard work and other house projects, as they cause significant SOB on exertion. He used to be able to do projects by himself, but now he's limited as a result of the shortness of breath. This is relieved at rest. This afternoon, when he was sitting watching TV, he noticed a bilateral squeezing pain in his chest that radiated to the middle of his chest. He described this feeling as a warm pressure. This did not radiate to the neck or jaw. He rated this pain as an 8/10. No pleuritic chest pain, not reproducible either. Leaning forward did not improve the pain either. This did not feel like previous chest pain as his prior coronary artery disease attacks were more localized to the left side of his chest. When he took his nitroglycerin in the ED, he says the sensation worsened, but then it was relieved. He's currently only describing a mild, constant pain in his left chest.    ED Course   Vitals - 180/86, 84, 18, 97.5 F, 98% RA    Lab Results   Component Value Date    WBC 10.2 07/04/2025    HGB 13.0 (L) 07/04/2025    HCT 39.9 (L) 07/04/2025    MCV 93 07/04/2025     07/04/2025     Lab Results   Component Value Date    GLUCOSE 96 07/04/2025    CALCIUM 9.6 07/04/2025     07/04/2025    K 4.5 07/04/2025    CO2 26 07/04/2025     07/04/2025    BUN 29 (H) 07/04/2025    CREATININE 1.58 (H) 07/04/2025     Lab Results   Component Value Date    TROPHS 3 07/04/2025     Lab Results   Component Value Date    BNP 18 07/04/2025     Lab Results   Component Value Date    DDIMERVTE 478 07/04/2025   - COVID and Flu negative  - Mag 1.97    Imaging  XR chest 1 view   Final Result   No acute pulmonary abnormality.    Signed by William Martins MD           Micro:   - Blood culture: NI  - UA: NI    ED Interventions:   - Aspirin loading, nitroglycerin tab x1    ROS: 12 points review of system is negative except as stated in the HPI above.     Past Medical History   He has a past medical history of Anxiety, CAD (coronary artery disease), native coronary artery, Colon polyps, Dupuytren contracture, Encounter for colorectal cancer screening (02/19/2025), GERD (gastroesophageal reflux disease), HLD (hyperlipidemia), HTN (hypertension), Moderate aortic valve stenosis (07/15/2024), and Vitamin D deficiency.  Surgical History   Surgical History[1]  Family History   Family History[2]  Social History     Social History     Socioeconomic History    Marital status:      Spouse name: Not on file    Number of children: Not on file    Years of education: Not on file    Highest education level: Not on file   Occupational History    Not on file   Tobacco Use    Smoking status: Never    Smokeless tobacco: Never   Substance and Sexual Activity    Alcohol use: Yes     Comment: Occasional    Drug use: Never    Sexual activity: Not on file   Other Topics Concern    Not on file   Social History Narrative    Not on file     Social Drivers of Health     Financial Resource Strain: Low Risk  (7/4/2025)    Overall Financial Resource Strain (CARDIA)     Difficulty of Paying Living Expenses: Not hard at all   Food Insecurity: No Food Insecurity (7/4/2025)    Hunger Vital Sign     Worried About Running Out of Food in the Last Year: Never true     Ran Out of Food in the Last Year: Never true   Transportation Needs: No Transportation Needs (7/4/2025)    PRAPARE - Transportation     Lack of Transportation (Medical): No     Lack of Transportation (Non-Medical): No   Physical Activity: Not on file   Stress: Not on file   Social Connections: Not on file   Intimate Partner Violence: Not At Risk (7/4/2025)    Humiliation, Afraid, Rape, and Kick questionnaire  "    Fear of Current or Ex-Partner: No     Emotionally Abused: No     Physically Abused: No     Sexually Abused: No   Housing Stability: Low Risk  (7/4/2025)    Housing Stability Vital Sign     Unable to Pay for Housing in the Last Year: No     Number of Times Moved in the Last Year: 0     Homeless in the Last Year: No       Tobacco Use: Low Risk  (7/4/2025)    Patient History     Smoking Tobacco Use: Never     Smokeless Tobacco Use: Never     Passive Exposure: Not on file        Social History     Substance and Sexual Activity   Alcohol Use Yes    Comment: Occasional      Allergies   RX Allergies[3]     Objective     Vitals  Visit Vitals  /81 (BP Location: Left arm)   Pulse 61   Temp 36.6 °C (97.9 °F) (Temporal)   Resp 18   Ht 1.727 m (5' 8\")   Wt 88.5 kg (195 lb)   SpO2 97%   BMI 29.65 kg/m²   Smoking Status Never   BSA 2.06 m²        Physical Examination:  Physical Exam  Vitals reviewed.   Constitutional:       General: He is not in acute distress.     Appearance: He is not ill-appearing.   Cardiovascular:      Rate and Rhythm: Normal rate and regular rhythm.      Heart sounds: Murmur (Systolic murmur most profound on left chest during auscultation) heard.   Pulmonary:      Effort: No respiratory distress.      Breath sounds: Normal breath sounds. No wheezing.   Chest:      Chest wall: No tenderness.   Abdominal:      General: There is no distension.      Palpations: Abdomen is soft.      Tenderness: There is no abdominal tenderness.   Musculoskeletal:         General: No tenderness.      Right lower leg: No edema.      Left lower leg: No edema.   Neurological:      General: No focal deficit present.      Mental Status: He is alert and oriented to person, place, and time. Mental status is at baseline.           I/Os  No intake or output data in the 24 hours ending 07/04/25 6992    Labs:   Results from last 72 hours   Lab Units 07/04/25  1923   SODIUM mmol/L 138   POTASSIUM mmol/L 4.5   CHLORIDE mmol/L 104 " "  CO2 mmol/L 26   BUN mg/dL 29*   CREATININE mg/dL 1.58*   GLUCOSE mg/dL 96   CALCIUM mg/dL 9.6   ANION GAP mmol/L 13   EGFR mL/min/1.73m*2 46*      Results from last 72 hours   Lab Units 07/04/25  1923   WBC AUTO x10*3/uL 10.2   HEMOGLOBIN g/dL 13.0*   HEMATOCRIT % 39.9*   PLATELETS AUTO x10*3/uL 260   NEUTROS PCT AUTO % 59.7   LYMPHS PCT AUTO % 22.1   MONOS PCT AUTO % 9.0   EOS PCT AUTO % 7.5      Lab Results   Component Value Date    CALCIUM 9.6 07/04/2025      No results found for: \"CRP\"   [unfilled]   No results found for: \"CKTOTAL\", \"CKMB\", \"CKMBINDEX\", \"TROPONINI\"      Micro/ID:   No results found for the last 90 days.                   No lab exists for component: \"AGALPCRNB\"   .ID  No results found for: \"URINECULTURE\", \"BLOODCULT\", \"CSFCULTSMEAR\"    Assessment and Plan      Brayan Barron Jr. is a 70 y.o. male with a PMH of HTN, HLD, CAD w/ PCI to RCA in 2/2022, aortic stenosis, GERD, and anxiety admitted for chest pain and SOB on exertion likely caused by stable angina vs other etiology.    Acute Medical Issues   #Dyspnea on exertion  #Chest pain 2/2 stable angina vs aortic stenosis vs other etiology  #History of CAD  - Patient presented to the hospital with a 4-6 week history of SOB on exertion and bilateral chest pain  - EKG obtained in ER was negative for ST changes and NSR  - Troponin 3 --> 3, CXR normal  - S/p aspirin loading and nitroglycerin in ED  - HEART score 5  - Patient states that the nitroglycerin initially intensified his pain but then it significantly improved it 5 minutes afterwards  - Cardiac workup history is notable for a PCI to RCA in 2/2022 by Dr. Lemus, a negative stress test in 12/2023, and a TTE in 1/2025 showing normal EF of 55-60% with moderate AV stenosis   - Cardiology consulted, appreciate recs  - Ordered lipid panel and A1c  - Ordered limited TTE, however unfortunately echo cannot be done until Monday  - If chest pain worsens, will get repeat EKG and troponin  - Patient " already takes amlodipine 5 mg, an appropriate anti-anginal agent, will continue.  - Continue home aspirin 81 mg daily  - Continue home rosuvastatin 10 mg daily  - Will check thyroid levels as well        Chronic Medical Issues   #HTN  #HLD  #CAD  - Management as above    #Anxiety  - Continue citalopram 20 mg daily    #GERD  - Continue famotidine 40 mg daily      F: PO intake & IVF PRN   E: Replete PRN  N: Regular diet  GI ppx: None  DVT ppx: Lovenox subcutaneous    Antibiotics: None  O2: None  Tubes/Lines/Drains: PIVs    Code Status: No Order   Emergency Contact: Extended Emergency Contact Information  Primary Emergency Contact: Christina Barron  Home Phone: [phone]  Relation: Spouse  Secondary Emergency Contact: Gloria Pearce  Mobile Phone: [phone]  Relation: Daughter  Preferred language: English   needed? No     Disposition: 70 y.o.male admitted for chest pain in setting of stable angina vs aortic stenosis. Estimated LOS < 48 hours.    Balbir Lawson MD  PGY-3 IM resident         [1]   Past Surgical History:  Procedure Laterality Date    CARDIAC CATHETERIZATION  02/04/2022    times 2    CATARACT EXTRACTION Left 02/02/2021    CATARACT EXTRACTION Right 10/29/2019    COLONOSCOPY  07/17/2015    HAND CONTRACTURE RELEASE Right 01/10/2018    dupuytrens contracture    KNEE Right 09/26/2008    KNEE Left 05/18/2015   [2] No family history on file.  [3]   Allergies  Allergen Reactions    Codeine Hives and Rash

## 2025-07-05 NOTE — PROGRESS NOTES
07/05/25 0932   Discharge Planning   Living Arrangements Spouse/significant other   Support Systems Spouse/significant other   Assistance Needed patient is A&Ox4; independent in ADL's, no AD, no DME, drives; PCP Beatriz Mcdermott   Type of Residence Private residence   Number of Stairs Within Residence 12   Who is requesting discharge planning? Provider   Home or Post Acute Services None   Expected Discharge Disposition Home  (denies home going needs)   Does the patient need discharge transport arranged? No

## 2025-07-05 NOTE — CARE PLAN
The clinical goals for the shift include pt will report an absence of chest pressure throughout this shift    Problem: Pain - Adult  Goal: Verbalizes/displays adequate comfort level or baseline comfort level  Outcome: Progressing     Problem: Safety - Adult  Goal: Free from fall injury  Outcome: Progressing     Problem: Chronic Conditions and Co-morbidities  Goal: Patient's chronic conditions and co-morbidity symptoms are monitored and maintained or improved  Outcome: Progressing     Problem: Nutrition  Goal: Nutrient intake appropriate for maintaining nutritional needs  Outcome: Progressing

## 2025-07-06 VITALS
WEIGHT: 195 LBS | HEIGHT: 68 IN | SYSTOLIC BLOOD PRESSURE: 141 MMHG | TEMPERATURE: 98.1 F | DIASTOLIC BLOOD PRESSURE: 73 MMHG | OXYGEN SATURATION: 96 % | HEART RATE: 64 BPM | RESPIRATION RATE: 18 BRPM | BODY MASS INDEX: 29.55 KG/M2

## 2025-07-06 PROBLEM — I20.0 UNSTABLE ANGINA (MULTI): Status: ACTIVE | Noted: 2025-07-04

## 2025-07-06 LAB
ALBUMIN SERPL BCP-MCNC: 4 G/DL (ref 3.4–5)
ANION GAP SERPL CALC-SCNC: 11 MMOL/L (ref 10–20)
BUN SERPL-MCNC: 29 MG/DL (ref 6–23)
CALCIUM SERPL-MCNC: 9.5 MG/DL (ref 8.6–10.3)
CHLORIDE SERPL-SCNC: 105 MMOL/L (ref 98–107)
CO2 SERPL-SCNC: 25 MMOL/L (ref 21–32)
CREAT SERPL-MCNC: 1.38 MG/DL (ref 0.5–1.3)
EGFRCR SERPLBLD CKD-EPI 2021: 54 ML/MIN/1.73M*2
ERYTHROCYTE [DISTWIDTH] IN BLOOD BY AUTOMATED COUNT: 12.8 % (ref 11.5–14.5)
GLUCOSE SERPL-MCNC: 103 MG/DL (ref 74–99)
HCT VFR BLD AUTO: 41.2 % (ref 41–52)
HGB BLD-MCNC: 13.5 G/DL (ref 13.5–17.5)
MAGNESIUM SERPL-MCNC: 2.14 MG/DL (ref 1.6–2.4)
MCH RBC QN AUTO: 30.5 PG (ref 26–34)
MCHC RBC AUTO-ENTMCNC: 32.8 G/DL (ref 32–36)
MCV RBC AUTO: 93 FL (ref 80–100)
NRBC BLD-RTO: 0 /100 WBCS (ref 0–0)
PHOSPHATE SERPL-MCNC: 3.5 MG/DL (ref 2.5–4.9)
PLATELET # BLD AUTO: 254 X10*3/UL (ref 150–450)
POTASSIUM SERPL-SCNC: 4.4 MMOL/L (ref 3.5–5.3)
RBC # BLD AUTO: 4.42 X10*6/UL (ref 4.5–5.9)
SODIUM SERPL-SCNC: 137 MMOL/L (ref 136–145)
WBC # BLD AUTO: 9.7 X10*3/UL (ref 4.4–11.3)

## 2025-07-06 PROCEDURE — 2500000001 HC RX 250 WO HCPCS SELF ADMINISTERED DRUGS (ALT 637 FOR MEDICARE OP)

## 2025-07-06 PROCEDURE — 80069 RENAL FUNCTION PANEL: CPT

## 2025-07-06 PROCEDURE — 99232 SBSQ HOSP IP/OBS MODERATE 35: CPT | Performed by: NURSE PRACTITIONER

## 2025-07-06 PROCEDURE — 36415 COLL VENOUS BLD VENIPUNCTURE: CPT

## 2025-07-06 PROCEDURE — 2500000002 HC RX 250 W HCPCS SELF ADMINISTERED DRUGS (ALT 637 FOR MEDICARE OP, ALT 636 FOR OP/ED)

## 2025-07-06 PROCEDURE — 2500000004 HC RX 250 GENERAL PHARMACY W/ HCPCS (ALT 636 FOR OP/ED): Performed by: INTERNAL MEDICINE

## 2025-07-06 PROCEDURE — 99231 SBSQ HOSP IP/OBS SF/LOW 25: CPT

## 2025-07-06 PROCEDURE — 83735 ASSAY OF MAGNESIUM: CPT

## 2025-07-06 PROCEDURE — 85027 COMPLETE CBC AUTOMATED: CPT

## 2025-07-06 PROCEDURE — 1200000002 HC GENERAL ROOM WITH TELEMETRY DAILY

## 2025-07-06 RX ORDER — SODIUM CHLORIDE 9 MG/ML
100 INJECTION, SOLUTION INTRAVENOUS CONTINUOUS
Status: ACTIVE | OUTPATIENT
Start: 2025-07-07 | End: 2025-07-08

## 2025-07-06 RX ORDER — ACETAMINOPHEN 325 MG/1
650 TABLET ORAL EVERY 6 HOURS PRN
Status: DISPENSED | OUTPATIENT
Start: 2025-07-06

## 2025-07-06 RX ADMIN — AMLODIPINE BESYLATE 5 MG: 5 TABLET ORAL at 09:24

## 2025-07-06 RX ADMIN — ACETAMINOPHEN 650 MG: 325 TABLET, FILM COATED ORAL at 20:34

## 2025-07-06 RX ADMIN — ENOXAPARIN SODIUM 40 MG: 40 INJECTION SUBCUTANEOUS at 09:24

## 2025-07-06 RX ADMIN — LOSARTAN POTASSIUM 50 MG: 50 TABLET, FILM COATED ORAL at 09:24

## 2025-07-06 RX ADMIN — FAMOTIDINE 40 MG: 20 TABLET, FILM COATED ORAL at 09:24

## 2025-07-06 RX ADMIN — ASPIRIN 81 MG: 81 TABLET, DELAYED RELEASE ORAL at 09:24

## 2025-07-06 RX ADMIN — ISOSORBIDE MONONITRATE 30 MG: 30 TABLET, EXTENDED RELEASE ORAL at 09:24

## 2025-07-06 RX ADMIN — ROSUVASTATIN CALCIUM 10 MG: 10 TABLET, FILM COATED ORAL at 09:24

## 2025-07-06 RX ADMIN — CITALOPRAM HYDROBROMIDE 20 MG: 20 TABLET ORAL at 09:24

## 2025-07-06 ASSESSMENT — COGNITIVE AND FUNCTIONAL STATUS - GENERAL
DAILY ACTIVITIY SCORE: 24
MOBILITY SCORE: 24

## 2025-07-06 ASSESSMENT — PAIN - FUNCTIONAL ASSESSMENT: PAIN_FUNCTIONAL_ASSESSMENT: 0-10

## 2025-07-06 ASSESSMENT — PAIN SCALES - GENERAL
PAINLEVEL_OUTOF10: 0 - NO PAIN
PAINLEVEL_OUTOF10: 0 - NO PAIN

## 2025-07-06 NOTE — PROGRESS NOTES
Subjective Data:  Denies chest pain    Objective Data:  Last Recorded Vitals:  Vitals:    07/05/25 1622 07/05/25 1950 07/06/25 0400 07/06/25 0725   BP: 132/72 118/71 121/71 105/65   BP Location: Left arm Right arm Right arm Right arm   Patient Position:  Lying Lying Sitting   Pulse: 65 68 64 74   Resp:  18 16 17   Temp: 37 °C (98.6 °F) 36.8 °C (98.2 °F) 36.7 °C (98.1 °F) 36.5 °C (97.7 °F)   TempSrc: Temporal Temporal Temporal Temporal   SpO2: 97% 96% 96% 98%   Weight:       Height:           Last Labs:  CBC - 7/6/2025:  5:13 AM  9.7 13.5 254    41.2      CMP - 7/6/2025:  5:13 AM  9.5 7.9 15 --- 0.6   3.5 4.0 14 72      PTT - No results in last year.  _   _ _     TROPHS   Date/Time Value Ref Range Status   07/04/2025 08:35 PM 3 0 - 20 ng/L Final   07/04/2025 07:23 PM 3 0 - 20 ng/L Final     BNP   Date/Time Value Ref Range Status   07/04/2025 07:23 PM 18 0 - 99 pg/mL Final     HGBA1C   Date/Time Value Ref Range Status   07/04/2025 07:23 PM 5.7 See comment % Final   01/24/2025 07:30 AM 5.5 See comment % Final     LDLCALC   Date/Time Value Ref Range Status   07/04/2025 08:35 PM 54 <=99 mg/dL Final     Comment:                                 Near   Borderline      AGE      Desirable  Optimal    High     High     Very High     0-19 Y     0 - 109     ---    110-129   >/= 130     ----    20-24 Y     0 - 119     ---    120-159   >/= 160     ----      >24 Y     0 -  99   100-129  130-159   160-189     >/=190    LDL Cholesterol is calculated using the Friedewald equation.   01/24/2025 07:30 AM 77 <=99 mg/dL Final     Comment:                                 Near   Borderline      AGE      Desirable  Optimal    High     High     Very High     0-19 Y     0 - 109     ---    110-129   >/= 130     ----    20-24 Y     0 - 119     ---    120-159   >/= 160     ----      >24 Y     0 -  99   100-129  130-159   160-189     >/=190     05/20/2024 11:50 AM 76 <=99 mg/dL Final     Comment:                                 Near   Borderline       AGE      Desirable  Optimal    High     High     Very High     0-19 Y     0 - 109     ---    110-129   >/= 130     ----    20-24 Y     0 - 119     ---    120-159   >/= 160     ----      >24 Y     0 -  99   100-129  130-159   160-189     >/=190       VLDL   Date/Time Value Ref Range Status   07/04/2025 08:35 PM 34 0 - 40 mg/dL Final   01/24/2025 07:30 AM 27 0 - 40 mg/dL Final   05/20/2024 11:50 AM 30 0 - 40 mg/dL Final      Last I/O:  I/O last 3 completed shifts:  In: 720 (8.1 mL/kg) [P.O.:720]  Out: - (0 mL/kg)   Weight: 88.5 kg     Past Cardiology Tests (Last 3 Years):  Echo:  Transthoracic Echo (TTE) Complete 01/15/2025  CONCLUSIONS:   1. The left ventricular systolic function is normal, with a visually estimated ejection fraction of 55-60%.   2. Spectral Doppler shows a Grade I (impaired relaxation pattern) of left ventricular diastolic filling with normal left atrial filling pressure.   3. There is normal right ventricular global systolic function.   4. Moderate aortic valve stenosis. The aortic valve dimensionless index is 0.33. There is no evidence of aortic valve regurgitation. The peak instantaneous gradient of the aortic valve is 27 mmHg. The mean gradient of the aortic valve is 16 mmHg.    Ejection Fractions:  EF   Date/Time Value Ref Range Status   01/15/2025 08:45 AM 58 %      Cath:  [facility]  2/4/2022  1. Left main: mild irregularities.  2. LAD: 30% mid-vessel disease, 50% ostial D1.  3. LCx: no significant angiographic disease.  4. RCA: 90% distal stenosis, 90% ostial rPDA stenosis, 90% ostial rPDA disease ([place] 1,1,1).  5. LVEDP 12mmHg, no aortic stenosis on LV-Ao gradient.  6. Successful PCI to severe distal RCA bifurcation stenosis ([place] 1,1,1) with one Synergy LIZANDRO with PTCA of rPLV sidebranch.    Stress Test:  Nuclear Stress Test 12/20/2023  1. Myocardial perfusion study without evidence of stress-induced  ischemia.  2. No evidence of myocardial infarction.  3. The left ventricle is normal in  size.  4. Normal LV wall motion with an LV EF estimated at greater than 65%.    Cardiac Imaging:  No results found for this or any previous visit from the past 1095 days.      Inpatient Medications:  Scheduled Medications[1]  PRN Medications[2]  Continuous Medications[3]    Physical Exam  Vitals reviewed.   Constitutional:       Appearance: Normal appearance. He is normal weight.   HENT:      Head: Normocephalic and atraumatic.   Neck:      Vascular: No carotid bruit or JVD.   Cardiovascular:      Rate and Rhythm: Normal rate and regular rhythm.      Pulses: Normal pulses.      Heart sounds: Murmur heard.      Systolic murmur is present with a grade of 3/6.   Pulmonary:      Effort: Pulmonary effort is normal.      Breath sounds: Normal breath sounds.   Chest:      Chest wall: No tenderness.   Abdominal:      General: Abdomen is flat. Bowel sounds are normal.      Palpations: Abdomen is soft.   Skin:     General: Skin is warm and dry.   Neurological:      General: No focal deficit present.      Mental Status: He is alert and oriented to person, place, and time. Mental status is at baseline.   Psychiatric:         Mood and Affect: Mood normal.         Behavior: Behavior normal.         Assessment/Plan   Assessment  70 y.o. male with PMH of CAD s/p PCI with LIZANDRO to  dRCA and PTCA of rPLV sidebranch (2/2022), moderate AS (TTE 1/2025), HTN, HLD, GERD, CKD (BL creat 1.3), anxiety presenting with progressive exertional dyspnea and chest pain. Lab work shows creatinine 1.38, eGFR 46, BNP 18, troponin 3, 3. CXR without acute process. EKG shows NSR HR 64 bpm.      Unstable angina  CAD s/p PCI with LIZANDRO to dRCA and PTCA of rPLV sidebranch (2/2022)   Moderate AS (TTE 1/2025)  Essential HTN  HLD   CKD     Plan  -TTE for structural and functional assessment  -NPO after midnight for [facility] tomorrow  -Pre-procedure IVF  -Continue Imdur 30 mg daily  -Continue aspirin 81 mg daily, rosuvastatin 10 mg daily  -Continue amlodipine 5 mg  daily, losartan 50 mg daily  -Will follow    Peripheral IV 07/04/25 20 G Right Antecubital (Active)   Site Assessment Clean;Dry;Intact 07/05/25 2012   Dressing Status Clean;Dry 07/05/25 2012   Number of days: 2       Code Status:  Full Code          Eliel Mendes, ZEENAT-LISS         [1]   Scheduled medications   Medication Dose Route Frequency    alum-mag hydroxide-simeth  10 mL oral BID    amLODIPine  5 mg oral Daily    aspirin  81 mg oral Daily    citalopram  20 mg oral Daily    enoxaparin  40 mg subcutaneous q24h    famotidine  40 mg oral Daily    isosorbide mononitrate ER  30 mg oral Daily    losartan  50 mg oral Daily    rosuvastatin  10 mg oral Daily   [2]   PRN medications   Medication    nitroglycerin   [3]   Continuous Medications   Medication Dose Last Rate

## 2025-07-06 NOTE — PROGRESS NOTES
Department of Hospital Medicine  Daily Progress Note   Hospital Day: 3       Name:Brayan Barron Jr., AGE: 70 y.o., GENDER: male, MRN: [id number], ROOM: 232/[facility]   CODE STATUS: Full Code  Attending Physician: Napoleon Luis MD  Resident: Lanny Conte MD        Chief Complaint     Chief Complaint   Patient presents with    Shortness of Breath        Interval History   Brayan Barron Jr. is a 70 y.o. year old male patient on Hospital Day: 3    Overnight events:  No acute events overnight  Seen and examined at bedside this morning.  Patient had no active/acute complaints.  Patient is lying comfortably in bed.  Patient denies any chest pain, no SOB on exertion.    Subjective    Meds    Scheduled medications  Scheduled Medications[1]  Continuous medications  Continuous Medications[2]  PRN medications  PRN Medications[3]     Objective    Exam   Physical Exam  Constitutional:       Appearance: Normal appearance. He is normal weight.   HENT:      Head: Normocephalic and atraumatic.      Mouth/Throat:      Mouth: Mucous membranes are moist.   Cardiovascular:      Rate and Rhythm: Normal rate and regular rhythm.      Heart sounds: Murmur heard.      No friction rub. No gallop.   Pulmonary:      Breath sounds: Normal breath sounds. No stridor. No wheezing or rhonchi.   Abdominal:      General: Abdomen is flat. Bowel sounds are normal.      Palpations: Abdomen is soft. There is no mass.      Tenderness: There is no abdominal tenderness. There is no guarding.   Musculoskeletal:         General: Swelling present. No tenderness. Normal range of motion.      Cervical back: Normal range of motion.   Neurological:      General: No focal deficit present.      Mental Status: He is alert and oriented to person, place, and time.   Psychiatric:         Mood and Affect: Mood normal.         Behavior: Behavior normal.          Vitals         7/6/2025     4:00 AM 7/6/2025     7:25 AM 7/6/2025     8:34 AM 7/6/2025    12:27 PM 7/6/2025    12:29  PM 7/6/2025     2:27 PM 7/6/2025     4:22 PM   Vitals   Systolic 121 105   115  128   Diastolic 71 65   64  72   BP Location Right arm Right arm   Left arm  Left arm   Heart Rate 64 74 76 63 64 87 64   Temp 36.7 °C (98.1 °F) 36.5 °C (97.7 °F)   37 °C (98.6 °F)  36.7 °C (98.1 °F)   Resp 16 17   18  18        Intake/Output Summary (Last 24 hours) at 7/6/2025 1629  Last data filed at 7/6/2025 1227  Gross per 24 hour   Intake 410 ml   Output --   Net 410 ml       Labs   Labs:   Results from last 72 hours   Lab Units 07/06/25  0513 07/05/25  0540 07/04/25  1923   SODIUM mmol/L 137 138 138   POTASSIUM mmol/L 4.4 4.4 4.5   CHLORIDE mmol/L 105 104 104   CO2 mmol/L 25 27 26   BUN mg/dL 29* 30* 29*   CREATININE mg/dL 1.38* 1.28 1.58*   GLUCOSE mg/dL 103* 96 96   CALCIUM mg/dL 9.5 9.5 9.6   ANION GAP mmol/L 11 11 13   EGFR mL/min/1.73m*2 54* 59* 46*   PHOSPHORUS mg/dL 3.5 3.1  --       Results from last 72 hours   Lab Units 07/06/25  0513 07/05/25  0540 07/04/25 1923   WBC AUTO x10*3/uL 9.7 9.1 10.2   HEMOGLOBIN g/dL 13.5 13.6 13.0*   HEMATOCRIT % 41.2 40.9* 39.9*   PLATELETS AUTO x10*3/uL 254 251 260   NEUTROS PCT AUTO %  --   --  59.7   LYMPHS PCT AUTO %  --   --  22.1   MONOS PCT AUTO %  --   --  9.0   EOS PCT AUTO %  --   --  7.5      Lab Results   Component Value Date    CALCIUM 9.5 07/06/2025    PHOS 3.5 07/06/2025        Images    Imaging  XR chest 1 view  Result Date: 7/4/2025  No acute pulmonary abnormality. Signed by William Martins MD      Cardiology, Vascular, and Other Imaging  No other imaging results found for the past 7 days      Assessment    Assessment and Plan    Brayan Barron Jr. is a 70 y.o. male admitted on 7/4/2025 with dyspnea on exertion and chest pain.  Has a a past medical history significant for HTN, HLD, CAD status post PCI to RCA and PTCA of rPLV sidebranch.  Cardiology on board.  Patient to get cardiac cath on 07/07    Updates 7/6/2025-  Spoke with cardiology, plan to take patient for left heart  cath on Monday  NPO past mid night.    ACUTE MEDICAL ISSUES:    Unstable angina  CAD s/p PCI with LIZANDRO to dRCA and PTCA of rPLV sidebranch   Aortic Stenosis   HTN   HFpEF  - SX: Presented with dyspnea on exertion with associated chest pain.  Upon admission had chest pain similar to the one he had in 2022  - Labs 07/04: troponins negative at 3 --> 3.  Lipid panel with elevated triglycerides at 168, normal cholesterol 122, LDL 54, VLDL 34, HDL 34.8.  BNP 18 (WNL)  - Imaging done included chest x-ray which showed no acute pulmonary abnormality.  Cardiomediastinal silhouette is normal in size and configuration  - Reviewed echo from 01/15/2025: LVEF 55 to 60%, spectral Doppler shows a grade 1 of LV diastolic filling with normal LA filling pressure.  Normal RV global systolic function.  Moderate AV stenosis  - Patient s/p aspirin loading and nitroglycerin in ED   : : HEART score 5   [ ] HbA1c- 5.7%  [ ] TTE ordered- FOLLOW UP OFFICIAL REPORT  PLAN:  - Cardiology consulted  - Plan for patient to get left heart cath on Monday 07/07 --> n.p.o. orders placed for Sunday night.  - Continue amlodipine 5 mg every day  - Continue aspirin 81 mg every day  - Continue losartan 50 mg every day.  Continue with Imdur 30 mg.  PRN Nitroglycerin 0.4 mg.    CHRONIC MEDICAL ISSUES:  # HLD: Continue rosuvastatin 10 mg every day  # MDD/MIMA: Continue citalopram 20 mg every day  # GERD: Continue famotidine 40 mg every day.  Will start patient on Mylanta 10 cc twice a day to rule out any GI causes for the chest discomfort.    Fluids: Nacl, 100 ml/ hour  Electrolytes: Replete as needed  Nutrition: NPO past midnight.  Antimicrobials: None indicated  DVT ppx: Lovenox on hold, planned for cath on Monday  GI ppx: famotidine  Catheter: None  Lines: 20-gauge PIV in right antecubital  Supplemental Oxygen: On room air  Emergency Contact: Extended Emergency Contact Information  Primary Emergency Contact: Christina Barron  Home Phone: [phone]  Relation:  Spouse  Secondary Emergency Contact: Gloria Pearce  Mobile Phone: [phone]  Relation: Daughter  Preferred language: English   needed? No   Code: Full Code     Disposition: Discharge pending cardiology clearance.  Likely discharge Monday after left heart cath.    Lanny Conte MD  Internal Medicine, PGY- 2  07/06/25 at 4:29 PM                  [1] alum-mag hydroxide-simeth, 10 mL, oral, BID  amLODIPine, 5 mg, oral, Daily  aspirin, 81 mg, oral, Daily  citalopram, 20 mg, oral, Daily  enoxaparin, 40 mg, subcutaneous, q24h  famotidine, 40 mg, oral, Daily  isosorbide mononitrate ER, 30 mg, oral, Daily  losartan, 50 mg, oral, Daily  rosuvastatin, 10 mg, oral, Daily     [2] [START ON 7/7/2025] sodium chloride 0.9%, 100 mL/hr     [3] PRN medications: acetaminophen, nitroglycerin

## 2025-07-06 NOTE — CARE PLAN
The patient's goals for the shift include      The clinical goals for the shift include pt will report an absence of chest pressure throughout this shift    Problem: Safety - Adult  Goal: Free from fall injury  Outcome: Progressing

## 2025-07-07 ENCOUNTER — APPOINTMENT (OUTPATIENT)
Dept: CARDIOLOGY | Facility: HOSPITAL | Age: 70
DRG: 322 | End: 2025-07-07
Payer: MEDICARE

## 2025-07-07 LAB
ACT BLD: 274 SEC (ref 83–199)
ACT BLD: >397 SEC (ref 83–199)
ACT BLD: >397 SEC (ref 83–199)
ALBUMIN SERPL BCP-MCNC: 4 G/DL (ref 3.4–5)
ANION GAP SERPL CALC-SCNC: 9 MMOL/L (ref 10–20)
AORTIC VALVE MEAN GRADIENT: 21 MMHG
AORTIC VALVE PEAK VELOCITY: 2.96 M/S
ATRIAL RATE: 64 BPM
ATRIAL RATE: 67 BPM
AV PEAK GRADIENT: 35 MMHG
AVA (PEAK VEL): 1.21 CM2
AVA (VTI): 1.07 CM2
BUN SERPL-MCNC: 30 MG/DL (ref 6–23)
CALCIUM SERPL-MCNC: 9.7 MG/DL (ref 8.6–10.3)
CHLORIDE SERPL-SCNC: 104 MMOL/L (ref 98–107)
CO2 SERPL-SCNC: 29 MMOL/L (ref 21–32)
CREAT SERPL-MCNC: 1.45 MG/DL (ref 0.5–1.3)
EGFRCR SERPLBLD CKD-EPI 2021: 51 ML/MIN/1.73M*2
EJECTION FRACTION APICAL 4 CHAMBER: 76.4
EJECTION FRACTION: 69 %
ERYTHROCYTE [DISTWIDTH] IN BLOOD BY AUTOMATED COUNT: 12.9 % (ref 11.5–14.5)
GLUCOSE SERPL-MCNC: 100 MG/DL (ref 74–99)
HCT VFR BLD AUTO: 39.7 % (ref 41–52)
HGB BLD-MCNC: 13.2 G/DL (ref 13.5–17.5)
LEFT ATRIUM VOLUME AREA LENGTH INDEX BSA: 21.7 ML/M2
LEFT VENTRICLE INTERNAL DIMENSION DIASTOLE: 4.61 CM (ref 3.5–6)
LEFT VENTRICULAR OUTFLOW TRACT DIAMETER: 2.22 CM
MAGNESIUM SERPL-MCNC: 2.1 MG/DL (ref 1.6–2.4)
MCH RBC QN AUTO: 31.3 PG (ref 26–34)
MCHC RBC AUTO-ENTMCNC: 33.2 G/DL (ref 32–36)
MCV RBC AUTO: 94 FL (ref 80–100)
MITRAL VALVE E/A RATIO: 0.86
NRBC BLD-RTO: 0 /100 WBCS (ref 0–0)
P AXIS: 36 DEGREES
P AXIS: 62 DEGREES
P OFFSET: 187 MS
P OFFSET: 194 MS
P ONSET: 141 MS
P ONSET: 146 MS
PHOSPHATE SERPL-MCNC: 3.3 MG/DL (ref 2.5–4.9)
PLATELET # BLD AUTO: 243 X10*3/UL (ref 150–450)
POTASSIUM SERPL-SCNC: 4.7 MMOL/L (ref 3.5–5.3)
PR INTERVAL: 158 MS
PR INTERVAL: 166 MS
Q ONSET: 224 MS
Q ONSET: 225 MS
QRS COUNT: 11 BEATS
QRS COUNT: 11 BEATS
QRS DURATION: 88 MS
QRS DURATION: 90 MS
QT INTERVAL: 382 MS
QT INTERVAL: 412 MS
QTC CALCULATION(BAZETT): 403 MS
QTC CALCULATION(BAZETT): 425 MS
QTC FREDERICIA: 396 MS
QTC FREDERICIA: 420 MS
R AXIS: 20 DEGREES
R AXIS: 49 DEGREES
RBC # BLD AUTO: 4.22 X10*6/UL (ref 4.5–5.9)
RIGHT VENTRICLE PEAK SYSTOLIC PRESSURE: 15 MMHG
SODIUM SERPL-SCNC: 137 MMOL/L (ref 136–145)
T AXIS: 33 DEGREES
T AXIS: 42 DEGREES
T OFFSET: 416 MS
T OFFSET: 430 MS
TRICUSPID ANNULAR PLANE SYSTOLIC EXCURSION: 1.6 CM
VENTRICULAR RATE: 64 BPM
VENTRICULAR RATE: 67 BPM
WBC # BLD AUTO: 9.6 X10*3/UL (ref 4.4–11.3)

## 2025-07-07 PROCEDURE — 93571 IV DOP VEL&/PRESS C FLO 1ST: CPT | Performed by: INTERNAL MEDICINE

## 2025-07-07 PROCEDURE — 92978 ENDOLUMINL IVUS OCT C 1ST: CPT | Mod: LD | Performed by: INTERNAL MEDICINE

## 2025-07-07 PROCEDURE — C1760 CLOSURE DEV, VASC: HCPCS | Performed by: INTERNAL MEDICINE

## 2025-07-07 PROCEDURE — 2780000003 HC OR 278 NO HCPCS: Performed by: INTERNAL MEDICINE

## 2025-07-07 PROCEDURE — 99232 SBSQ HOSP IP/OBS MODERATE 35: CPT | Performed by: INTERNAL MEDICINE

## 2025-07-07 PROCEDURE — 85347 COAGULATION TIME ACTIVATED: CPT

## 2025-07-07 PROCEDURE — 83735 ASSAY OF MAGNESIUM: CPT

## 2025-07-07 PROCEDURE — 93460 R&L HRT ART/VENTRICLE ANGIO: CPT | Performed by: INTERNAL MEDICINE

## 2025-07-07 PROCEDURE — 7100000002 HC RECOVERY ROOM TIME - EACH INCREMENTAL 1 MINUTE: Performed by: INTERNAL MEDICINE

## 2025-07-07 PROCEDURE — 2500000004 HC RX 250 GENERAL PHARMACY W/ HCPCS (ALT 636 FOR OP/ED): Performed by: INTERNAL MEDICINE

## 2025-07-07 PROCEDURE — 85347 COAGULATION TIME ACTIVATED: CPT | Performed by: INTERNAL MEDICINE

## 2025-07-07 PROCEDURE — C1769 GUIDE WIRE: HCPCS | Performed by: INTERNAL MEDICINE

## 2025-07-07 PROCEDURE — 99153 MOD SED SAME PHYS/QHP EA: CPT | Performed by: INTERNAL MEDICINE

## 2025-07-07 PROCEDURE — 2720000007 HC OR 272 NO HCPCS: Performed by: INTERNAL MEDICINE

## 2025-07-07 PROCEDURE — 7100000001 HC RECOVERY ROOM TIME - INITIAL BASE CHARGE: Performed by: INTERNAL MEDICINE

## 2025-07-07 PROCEDURE — 2500000004 HC RX 250 GENERAL PHARMACY W/ HCPCS (ALT 636 FOR OP/ED): Performed by: NURSE PRACTITIONER

## 2025-07-07 PROCEDURE — 4A023N8 MEASUREMENT OF CARDIAC SAMPLING AND PRESSURE, BILATERAL, PERCUTANEOUS APPROACH: ICD-10-PCS | Performed by: NURSE PRACTITIONER

## 2025-07-07 PROCEDURE — B2111ZZ FLUOROSCOPY OF MULTIPLE CORONARY ARTERIES USING LOW OSMOLAR CONTRAST: ICD-10-PCS | Performed by: NURSE PRACTITIONER

## 2025-07-07 PROCEDURE — 2500000002 HC RX 250 W HCPCS SELF ADMINISTERED DRUGS (ALT 637 FOR MEDICARE OP, ALT 636 FOR OP/ED): Performed by: PHYSICIAN ASSISTANT

## 2025-07-07 PROCEDURE — 93306 TTE W/DOPPLER COMPLETE: CPT | Performed by: INTERNAL MEDICINE

## 2025-07-07 PROCEDURE — 7100000009 HC PHASE TWO TIME - INITIAL BASE CHARGE: Performed by: INTERNAL MEDICINE

## 2025-07-07 PROCEDURE — 93571 IV DOP VEL&/PRESS C FLO 1ST: CPT | Mod: LD | Performed by: INTERNAL MEDICINE

## 2025-07-07 PROCEDURE — C9600 PERC DRUG-EL COR STENT SING: HCPCS | Mod: LD | Performed by: INTERNAL MEDICINE

## 2025-07-07 PROCEDURE — 92928 PRQ TCAT PLMT NTRAC ST 1 LES: CPT | Performed by: INTERNAL MEDICINE

## 2025-07-07 PROCEDURE — 2500000002 HC RX 250 W HCPCS SELF ADMINISTERED DRUGS (ALT 637 FOR MEDICARE OP, ALT 636 FOR OP/ED): Performed by: INTERNAL MEDICINE

## 2025-07-07 PROCEDURE — C1874 STENT, COATED/COV W/DEL SYS: HCPCS | Performed by: INTERNAL MEDICINE

## 2025-07-07 PROCEDURE — C1887 CATHETER, GUIDING: HCPCS | Performed by: INTERNAL MEDICINE

## 2025-07-07 PROCEDURE — 93306 TTE W/DOPPLER COMPLETE: CPT

## 2025-07-07 PROCEDURE — 94760 N-INVAS EAR/PLS OXIMETRY 1: CPT

## 2025-07-07 PROCEDURE — 92978 ENDOLUMINL IVUS OCT C 1ST: CPT | Performed by: INTERNAL MEDICINE

## 2025-07-07 PROCEDURE — 99152 MOD SED SAME PHYS/QHP 5/>YRS: CPT | Performed by: INTERNAL MEDICINE

## 2025-07-07 PROCEDURE — 7100000010 HC PHASE TWO TIME - EACH INCREMENTAL 1 MINUTE: Performed by: INTERNAL MEDICINE

## 2025-07-07 PROCEDURE — 2550000001 HC RX 255 CONTRASTS: Performed by: INTERNAL MEDICINE

## 2025-07-07 PROCEDURE — 36415 COLL VENOUS BLD VENIPUNCTURE: CPT

## 2025-07-07 PROCEDURE — 2500000001 HC RX 250 WO HCPCS SELF ADMINISTERED DRUGS (ALT 637 FOR MEDICARE OP): Performed by: INTERNAL MEDICINE

## 2025-07-07 PROCEDURE — 93005 ELECTROCARDIOGRAM TRACING: CPT

## 2025-07-07 PROCEDURE — 027035Z DILATION OF CORONARY ARTERY, ONE ARTERY WITH TWO DRUG-ELUTING INTRALUMINAL DEVICES, PERCUTANEOUS APPROACH: ICD-10-PCS | Performed by: NURSE PRACTITIONER

## 2025-07-07 PROCEDURE — C1753 CATH, INTRAVAS ULTRASOUND: HCPCS | Performed by: INTERNAL MEDICINE

## 2025-07-07 PROCEDURE — 2500000001 HC RX 250 WO HCPCS SELF ADMINISTERED DRUGS (ALT 637 FOR MEDICARE OP)

## 2025-07-07 PROCEDURE — C1894 INTRO/SHEATH, NON-LASER: HCPCS | Performed by: INTERNAL MEDICINE

## 2025-07-07 PROCEDURE — 99232 SBSQ HOSP IP/OBS MODERATE 35: CPT

## 2025-07-07 PROCEDURE — 4A033BC MEASUREMENT OF ARTERIAL PRESSURE, CORONARY, PERCUTANEOUS APPROACH: ICD-10-PCS | Performed by: NURSE PRACTITIONER

## 2025-07-07 PROCEDURE — 85027 COMPLETE CBC AUTOMATED: CPT

## 2025-07-07 PROCEDURE — 2060000001 HC INTERMEDIATE ICU ROOM DAILY

## 2025-07-07 PROCEDURE — 80069 RENAL FUNCTION PANEL: CPT

## 2025-07-07 PROCEDURE — 2500000002 HC RX 250 W HCPCS SELF ADMINISTERED DRUGS (ALT 637 FOR MEDICARE OP, ALT 636 FOR OP/ED)

## 2025-07-07 PROCEDURE — C1725 CATH, TRANSLUMIN NON-LASER: HCPCS | Performed by: INTERNAL MEDICINE

## 2025-07-07 DEVICE — STENT, ONYX FRONTIER DES, 2.50 X 15RX: Type: IMPLANTABLE DEVICE | Site: HEART | Status: FUNCTIONAL

## 2025-07-07 DEVICE — STENT ONYXNG25030UX ONYX 2.50X30RX
Type: IMPLANTABLE DEVICE | Site: HEART | Status: FUNCTIONAL
Brand: ONYX FRONTIER™

## 2025-07-07 RX ORDER — SODIUM CHLORIDE 9 MG/ML
3 INJECTION, SOLUTION INTRAVENOUS CONTINUOUS
OUTPATIENT
Start: 2025-07-07 | End: 2025-07-07

## 2025-07-07 RX ORDER — TICAGRELOR 60 MG/1
TABLET, FILM COATED ORAL AS NEEDED
Status: DISCONTINUED | OUTPATIENT
Start: 2025-07-07 | End: 2025-07-07 | Stop reason: HOSPADM

## 2025-07-07 RX ORDER — MIDAZOLAM HYDROCHLORIDE 1 MG/ML
INJECTION, SOLUTION INTRAMUSCULAR; INTRAVENOUS AS NEEDED
Status: DISCONTINUED | OUTPATIENT
Start: 2025-07-07 | End: 2025-07-07 | Stop reason: HOSPADM

## 2025-07-07 RX ORDER — TICAGRELOR 90 MG/1
90 TABLET, FILM COATED ORAL 2 TIMES DAILY
Status: DISCONTINUED | OUTPATIENT
Start: 2025-07-07 | End: 2025-07-08

## 2025-07-07 RX ORDER — FENTANYL CITRATE 50 UG/ML
INJECTION, SOLUTION INTRAMUSCULAR; INTRAVENOUS AS NEEDED
Status: DISCONTINUED | OUTPATIENT
Start: 2025-07-07 | End: 2025-07-07 | Stop reason: HOSPADM

## 2025-07-07 RX ORDER — SODIUM CHLORIDE 9 MG/ML
INJECTION, SOLUTION INTRAVENOUS CONTINUOUS PRN
Status: COMPLETED | OUTPATIENT
Start: 2025-07-07 | End: 2025-07-07

## 2025-07-07 RX ORDER — LIDOCAINE HYDROCHLORIDE 20 MG/ML
INJECTION, SOLUTION EPIDURAL; INFILTRATION; INTRACAUDAL; PERINEURAL AS NEEDED
Status: DISCONTINUED | OUTPATIENT
Start: 2025-07-07 | End: 2025-07-07 | Stop reason: HOSPADM

## 2025-07-07 RX ORDER — SODIUM CHLORIDE 9 MG/ML
200 INJECTION, SOLUTION INTRAVENOUS CONTINUOUS
Status: DISCONTINUED | OUTPATIENT
Start: 2025-07-07 | End: 2025-07-07

## 2025-07-07 RX ORDER — HEPARIN SODIUM 1000 [USP'U]/ML
INJECTION, SOLUTION INTRAVENOUS; SUBCUTANEOUS AS NEEDED
Status: DISCONTINUED | OUTPATIENT
Start: 2025-07-07 | End: 2025-07-07 | Stop reason: HOSPADM

## 2025-07-07 RX ORDER — CLOPIDOGREL BISULFATE 300 MG/1
300 TABLET, FILM COATED ORAL ONCE
Status: COMPLETED | OUTPATIENT
Start: 2025-07-07 | End: 2025-07-07

## 2025-07-07 RX ORDER — NITROGLYCERIN 40 MG/100ML
INJECTION INTRAVENOUS AS NEEDED
Status: DISCONTINUED | OUTPATIENT
Start: 2025-07-07 | End: 2025-07-07 | Stop reason: HOSPADM

## 2025-07-07 RX ORDER — VERAPAMIL HYDROCHLORIDE 2.5 MG/ML
INJECTION INTRAVENOUS AS NEEDED
Status: DISCONTINUED | OUTPATIENT
Start: 2025-07-07 | End: 2025-07-07 | Stop reason: HOSPADM

## 2025-07-07 RX ADMIN — AMLODIPINE BESYLATE 5 MG: 5 TABLET ORAL at 17:31

## 2025-07-07 RX ADMIN — SODIUM CHLORIDE 100 ML/HR: 9 INJECTION, SOLUTION INTRAVENOUS at 06:09

## 2025-07-07 RX ADMIN — CITALOPRAM HYDROBROMIDE 20 MG: 20 TABLET ORAL at 17:31

## 2025-07-07 RX ADMIN — ASPIRIN 81 MG: 81 TABLET, DELAYED RELEASE ORAL at 08:58

## 2025-07-07 RX ADMIN — TICAGRELOR 90 MG: 90 TABLET, FILM COATED ORAL at 20:32

## 2025-07-07 RX ADMIN — ROSUVASTATIN CALCIUM 10 MG: 10 TABLET, FILM COATED ORAL at 17:31

## 2025-07-07 RX ADMIN — SODIUM CHLORIDE 100 ML/HR: 9 INJECTION, SOLUTION INTRAVENOUS at 09:28

## 2025-07-07 RX ADMIN — CLOPIDOGREL BISULFATE 300 MG: 300 TABLET, FILM COATED ORAL at 15:42

## 2025-07-07 RX ADMIN — ISOSORBIDE MONONITRATE 30 MG: 30 TABLET, EXTENDED RELEASE ORAL at 17:31

## 2025-07-07 RX ADMIN — LOSARTAN POTASSIUM 50 MG: 50 TABLET, FILM COATED ORAL at 17:31

## 2025-07-07 ASSESSMENT — PAIN - FUNCTIONAL ASSESSMENT
PAIN_FUNCTIONAL_ASSESSMENT: 0-10

## 2025-07-07 ASSESSMENT — PAIN SCALES - GENERAL
PAINLEVEL_OUTOF10: 0 - NO PAIN
PAINLEVEL_OUTOF10: 1
PAINLEVEL_OUTOF10: 0 - NO PAIN

## 2025-07-07 ASSESSMENT — COGNITIVE AND FUNCTIONAL STATUS - GENERAL
MOBILITY SCORE: 24
MOBILITY SCORE: 24
DAILY ACTIVITIY SCORE: 24
DAILY ACTIVITIY SCORE: 24

## 2025-07-07 ASSESSMENT — COLUMBIA-SUICIDE SEVERITY RATING SCALE - C-SSRS
1. IN THE PAST MONTH, HAVE YOU WISHED YOU WERE DEAD OR WISHED YOU COULD GO TO SLEEP AND NOT WAKE UP?: NO
2. HAVE YOU ACTUALLY HAD ANY THOUGHTS OF KILLING YOURSELF?: NO
6. HAVE YOU EVER DONE ANYTHING, STARTED TO DO ANYTHING, OR PREPARED TO DO ANYTHING TO END YOUR LIFE?: NO

## 2025-07-07 NOTE — PROGRESS NOTES
Subjective Data:  Pt reports that he is feeling fine this AM. Has minor residual chest discomfort.   Currently Denies palpitations, dizziness, cough, shortness of breath, orthopnea, edema.      Overnight Events:    No acute issues reported overnight.      Objective Data:  Last Recorded Vitals:  Vitals:    07/06/25 1716 07/06/25 1930 07/07/25 0340 07/07/25 0740   BP:  141/73 101/67 136/78   BP Location:  Right arm Right arm Right arm   Patient Position:  Lying     Pulse: 86 64 66 66   Resp:  18 16 19   Temp:  36.7 °C (98.1 °F) 36.2 °C (97.2 °F) 36.7 °C (98.1 °F)   TempSrc:  Temporal Temporal Temporal   SpO2: 97% 96% 95% 95%   Weight:       Height:           Last Labs:  CBC - 7/7/2025:  5:47 AM  9.6 13.2 243    39.7      CMP - 7/7/2025:  5:47 AM  9.7 7.9 15 --- 0.6   3.3 4.0 14 72      PTT - No results in last year.  _   _ _     TROPHS   Date/Time Value Ref Range Status   07/04/2025 08:35 PM 3 0 - 20 ng/L Final   07/04/2025 07:23 PM 3 0 - 20 ng/L Final     BNP   Date/Time Value Ref Range Status   07/04/2025 07:23 PM 18 0 - 99 pg/mL Final     HGBA1C   Date/Time Value Ref Range Status   07/04/2025 07:23 PM 5.7 See comment % Final   01/24/2025 07:30 AM 5.5 See comment % Final     LDLCALC   Date/Time Value Ref Range Status   07/04/2025 08:35 PM 54 <=99 mg/dL Final     Comment:                                 Near   Borderline      AGE      Desirable  Optimal    High     High     Very High     0-19 Y     0 - 109     ---    110-129   >/= 130     ----    20-24 Y     0 - 119     ---    120-159   >/= 160     ----      >24 Y     0 -  99   100-129  130-159   160-189     >/=190    LDL Cholesterol is calculated using the Friedewald equation.   01/24/2025 07:30 AM 77 <=99 mg/dL Final     Comment:                                 Near   Borderline      AGE      Desirable  Optimal    High     High     Very High     0-19 Y     0 - 109     ---    110-129   >/= 130     ----    20-24 Y     0 - 119     ---    120-159   >/= 160     ----       >24 Y     0 -  99   100-129  130-159   160-189     >/=190     05/20/2024 11:50 AM 76 <=99 mg/dL Final     Comment:                                 Near   Borderline      AGE      Desirable  Optimal    High     High     Very High     0-19 Y     0 - 109     ---    110-129   >/= 130     ----    20-24 Y     0 - 119     ---    120-159   >/= 160     ----      >24 Y     0 -  99   100-129  130-159   160-189     >/=190       VLDL   Date/Time Value Ref Range Status   07/04/2025 08:35 PM 34 0 - 40 mg/dL Final   01/24/2025 07:30 AM 27 0 - 40 mg/dL Final   05/20/2024 11:50 AM 30 0 - 40 mg/dL Final      Last I/O:  I/O last 3 completed shifts:  In: 410 (4.6 mL/kg) [P.O.:410]  Out: - (0 mL/kg)   Weight: 88.5 kg     Past Cardiology Tests (Last 3 Years):  EKG:  ECG 12 lead (Clinic Performed) 07/15/2024 (Preliminary)    Echo:  Transthoracic Echo (TTE) Complete 01/15/2025  CONCLUSIONS:   1. The left ventricular systolic function is normal, with a visually estimated ejection fraction of 55-60%.   2. Spectral Doppler shows a Grade I (impaired relaxation pattern) of left ventricular diastolic filling with normal left atrial filling pressure.   3. There is normal right ventricular global systolic function.   4. Moderate aortic valve stenosis. The aortic valve dimensionless index is 0.33. There is no evidence of aortic valve regurgitation. The peak instantaneous gradient of the aortic valve is 27 mmHg. The mean gradient of the aortic valve is 16 mmHg.    Ejection Fractions:  EF   Date/Time Value Ref Range Status   01/15/2025 08:45 AM 58 %      Cath:  [facility]  2/4/2022  1. Left main: mild irregularities.  2. LAD: 30% mid-vessel disease, 50% ostial D1.  3. LCx: no significant angiographic disease.  4. RCA: 90% distal stenosis, 90% ostial rPDA stenosis, 90% ostial rPDA disease ([place] 1,1,1).  5. LVEDP 12mmHg, no aortic stenosis on LV-Ao gradient.  6. Successful PCI to severe distal RCA bifurcation stenosis ([place] 1,1,1) with one Synergy LIZANDRO  with PTCA of rPLV sidebranch.    Stress Test:  Nuclear Stress Test 12/20/2023  1. Myocardial perfusion study without evidence of stress-induced  ischemia.  2. No evidence of myocardial infarction.  3. The left ventricle is normal in size.  4. Normal LV wall motion with an LV EF estimated at greater than     Cardiac Imaging:  No results found for this or any previous visit from the past 1095 days.      Inpatient Medications:  Scheduled Medications[1]  PRN Medications[2]  Continuous Medications[3]    Physical Exam:  Physical Exam  Constitutional:       General: He is not in acute distress.  HENT:      Head: Normocephalic.      Nose: Nose normal.      Mouth/Throat:      Mouth: Mucous membranes are moist.   Eyes:      Conjunctiva/sclera: Conjunctivae normal.   Cardiovascular:      Rate and Rhythm: Normal rate and regular rhythm.      Pulses: Normal pulses.      Heart sounds: Normal heart sounds. No murmur heard.  Pulmonary:      Effort: Pulmonary effort is normal. No respiratory distress.   Abdominal:      General: Abdomen is flat.   Musculoskeletal:         General: No swelling.      Right lower leg: No edema.   Skin:     General: Skin is warm.   Neurological:      Mental Status: He is alert and oriented to person, place, and time. Mental status is at baseline.   Psychiatric:         Mood and Affect: Mood normal.         Behavior: Behavior normal.            Assessment/Plan   Brayan Barron is a 70 y.o. male with PMH of CAD s/p PCI with LIZANDRO to  dRCA and PTCA of rPLV sidebranch (2/2022), moderate AS (TTE 1/2025), HTN, HLD, GERD, CKD (BL creat 1.3), anxiety presenting with progressive exertional dyspnea and chest pain.      Unstable angina (  CAD s/p PCI with LIZANDRO to dRCA and PTCA of rPLV sidebranch (2/2022)   Moderate AS (TTE 1/2025)  Essential HTN  HLD   CKD Stage III    Loaded with Brilinta 180mg x1 in the lab, c/w 90mg BID tomorrow.   Echocardiogram reviewed with normal LVSF, EF 69%, normal RVSF, RVSP 15mmHg.   [facility]  today, with PCI (pending final report)   Post procedure IVF ordered   Continue Imdur 30 mg daily, Continue aspirin 81 mg daily, rosuvastatin 10 mg daily  Continue amlodipine 5 mg daily, losartan 50 mg daily  Will follow    Peripheral IV 07/04/25 20 G Right Antecubital (Active)   Site Assessment Clean;Dry;Intact 07/06/25 2052   Dressing Status Clean;Dry 07/06/25 2052   Number of days: 3       Code Status:  Full Code    I spent  minutes in the professional and overall care of this patient.        Zena Barton PA-C         [1]   Scheduled medications   Medication Dose Route Frequency    alum-mag hydroxide-simeth  10 mL oral BID    amLODIPine  5 mg oral Daily    aspirin  81 mg oral Daily    citalopram  20 mg oral Daily    [Held by provider] enoxaparin  40 mg subcutaneous q24h    famotidine  40 mg oral Daily    isosorbide mononitrate ER  30 mg oral Daily    losartan  50 mg oral Daily    rosuvastatin  10 mg oral Daily   [2]   PRN medications   Medication    acetaminophen    nitroglycerin   [3]   Continuous Medications   Medication Dose Last Rate    sodium chloride 0.9%  100 mL/hr 100 mL/hr (07/07/25 0609)

## 2025-07-07 NOTE — CARE PLAN
The patient's goals for the shift include      The clinical goals for the shift include no chest pain      Problem: Pain - Adult  Goal: Verbalizes/displays adequate comfort level or baseline comfort level  Outcome: Progressing     Problem: Safety - Adult  Goal: Free from fall injury  Outcome: Progressing     Problem: Discharge Planning  Goal: Discharge to home or other facility with appropriate resources  Outcome: Progressing     Problem: Chronic Conditions and Co-morbidities  Goal: Patient's chronic conditions and co-morbidity symptoms are monitored and maintained or improved  Outcome: Progressing     Problem: Nutrition  Goal: Nutrient intake appropriate for maintaining nutritional needs  Outcome: Progressing

## 2025-07-07 NOTE — CARE PLAN
The patient's goals for the shift include      The clinical goals for the shift include no chest pain    Problem: Safety - Adult  Goal: Free from fall injury  Outcome: Progressing

## 2025-07-07 NOTE — PROGRESS NOTES
Department of Hospital Medicine  Daily Progress Note   Hospital Day: 4       Name:Brayan Barron Jr., AGE: 70 y.o., GENDER: male, MRN: [id number], ROOM: [facility] *   CODE STATUS: Full Code  Attending Physician: Napoleon Luis MD  Resident: Ruslan Perry MD        Chief Complaint     Chief Complaint   Patient presents with    Shortness of Breath        Interval History   Brayan Barron Jr. is a 70 y.o. year old male patient on Hospital Day: 4    Overnight events:  No acute events overnight  Seen and examined at bedside this morning.  Patient had no active/acute complaints.  He was being taken for his cardiac cath    Subjective    Meds    Scheduled medications  Scheduled Medications[1]  Continuous medications  Continuous Medications[2]  PRN medications  PRN Medications[3]     Objective    Exam   Physical Exam  Constitutional:       Appearance: Normal appearance.   HENT:      Head: Normocephalic and atraumatic.      Mouth/Throat:      Mouth: Mucous membranes are moist.   Cardiovascular:      Rate and Rhythm: Normal rate and regular rhythm.      Heart sounds: Murmur heard.      No friction rub. No gallop.   Pulmonary:      Breath sounds: Normal breath sounds. No stridor. No wheezing or rhonchi.   Abdominal:      General: Abdomen is flat. Bowel sounds are normal.      Palpations: Abdomen is soft. There is no mass.      Tenderness: There is no abdominal tenderness. There is no guarding.   Musculoskeletal:         General: Swelling present. No tenderness. Normal range of motion.      Cervical back: Normal range of motion.   Neurological:      General: No focal deficit present.      Mental Status: He is alert and oriented to person, place, and time.   Psychiatric:         Mood and Affect: Mood normal.         Behavior: Behavior normal.          Vitals         7/7/2025     7:40 AM 7/7/2025     9:09 AM 7/7/2025    11:21 AM 7/7/2025     2:30 PM 7/7/2025     2:45 PM 7/7/2025     3:00 PM 7/7/2025     3:15 PM   Vitals  "  Systolic 136 157 167 154 155 146 154   Diastolic 78 86 90 65 74 66 81   BP Location Right arm         Heart Rate 66 72 75 68 73 68 70   Temp 36.7 °C (98.1 °F)         Resp 19 16 19 17 18 17 16   Height  1.72 m (5' 7.72\")        Weight (lb)  195.11        BMI  29.91 kg/m2        BSA (m2)  2.06 m2             Intake/Output Summary (Last 24 hours) at 7/7/2025 1531  Last data filed at 7/7/2025 1416  Gross per 24 hour   Intake 240 ml   Output 30 ml   Net 210 ml       Labs   Labs:   Results from last 72 hours   Lab Units 07/07/25  0547 07/06/25  0513 07/05/25  0540   SODIUM mmol/L 137 137 138   POTASSIUM mmol/L 4.7 4.4 4.4   CHLORIDE mmol/L 104 105 104   CO2 mmol/L 29 25 27   BUN mg/dL 30* 29* 30*   CREATININE mg/dL 1.45* 1.38* 1.28   GLUCOSE mg/dL 100* 103* 96   CALCIUM mg/dL 9.7 9.5 9.5   ANION GAP mmol/L 9* 11 11   EGFR mL/min/1.73m*2 51* 54* 59*   PHOSPHORUS mg/dL 3.3 3.5 3.1      Results from last 72 hours   Lab Units 07/07/25  0547 07/06/25  0513 07/05/25  0540 07/04/25  1923   WBC AUTO x10*3/uL 9.6 9.7 9.1 10.2   HEMOGLOBIN g/dL 13.2* 13.5 13.6 13.0*   HEMATOCRIT % 39.7* 41.2 40.9* 39.9*   PLATELETS AUTO x10*3/uL 243 254 251 260   NEUTROS PCT AUTO %  --   --   --  59.7   LYMPHS PCT AUTO %  --   --   --  22.1   MONOS PCT AUTO %  --   --   --  9.0   EOS PCT AUTO %  --   --   --  7.5      Lab Results   Component Value Date    CALCIUM 9.7 07/07/2025    PHOS 3.3 07/07/2025        Images    Imaging  XR chest 1 view  Result Date: 7/4/2025  No acute pulmonary abnormality. Signed by William Martins MD      Cardiology, Vascular, and Other Imaging  Transthoracic Echo Complete  Result Date: 7/7/2025   [facility], [address] [address]               Tel [phone] and Fax [phone] TRANSTHORACIC ECHOCARDIOGRAM REPORT  Patient Name:       JEANNINE Pelayo Physician:    [id number] Ronald Fischre MD Study Date:         7/7/2025 "            Ordering Provider:    [id number] FAINA DENNIS MRN/PID:            [id number]            Fellow: Accession#:         [id number]        Nurse: Date of Birth/Age:  1955 / 70 years Sonographer:          Chapis Ramesh RDCS Gender assigned at  M                   Additional Staff: Birth: Height:             172.72 cm           Admit Date:           7/4/2025 Weight:             88.45 kg            Admission Status:     Inpatient -                                                               Routine BSA / BMI:          2.02 m2 / 29.65     Encounter#:           [id number]                     kg/m2 Blood Pressure:     141/73 mmHg         Department Location:  [facility] Non                                                               Invasive Study Type:    TRANSTHORACIC ECHO (TTE) COMPLETE Diagnosis/ICD: Nonrheumatic aortic valve disorder, unspecified-I35.9 Indication:    AS CPT Code:      Echo Complete w Full Doppler-09157  Study Detail: The following Echo studies were performed: 2D, M-Mode, Doppler and               color flow. The patient was awake.  PHYSICIAN INTERPRETATION: Left Ventricle: Left ventricular ejection fraction is normal calculated by Hernandez's biplane at 69%. There are no regional left ventricular wall motion abnormalities. The left ventricular cavity size is normal. There is mildly increased septal and normal posterior left ventricular wall thickness. Spectral Doppler shows a normal pattern of left ventricular diastolic filling. Left Atrium: The left atrial size is normal. Right Ventricle: The right ventricle is normal in size. There is normal right ventricular global systolic function. Right Atrium: The right atrium is normal in size. Aortic Valve: The aortic valve is trileaflet. The aortic valve area by VTI is 1.07 cmï¿½ with a peak velocity of 2.96 m/s. The  peak and mean gradients are 21 mmHg and 21 mmHg, respectively with a dimensionless index of 0.28. There is evidence of moderate aortic valve stenosis. There is no evidence of aortic valve regurgitation. Mitral Valve: The mitral valve is normal in structure. The doppler estimated peak and mean diastolic pressure gradients are 3.5 mmHg and 1 mmHg, respectively. The mean gradient of the mitral valve is 1 mmHg. There is no evidence of mitral valve regurgitation. The E Vmax is 0.78 m/s. Tricuspid Valve: The tricuspid valve is structurally normal. No evidence of tricuspid regurgitation. The Doppler estimated right ventricular systolic pressure (RVSP) is within normal limits at 15 mmHg. Pulmonic Valve: The pulmonic valve is structurally normal. There is no indication of pulmonic valve regurgitation. Pericardium: There is no pericardial effusion noted. Aorta: The aortic root is normal. Systemic Veins: The inferior vena cava appears normal in size, with IVC inspiratory collapse greater than 50%. In comparison to the previous echocardiogram(s): Compared with study dated 1/15/2025, no significant change.  CONCLUSIONS:  1. Left ventricular ejection fraction is normal calculated by Hernandez's biplane at 69%.  2. There is normal right ventricular global systolic function.  3. The Doppler estimated RVSP is within normal limits at 15 mmHg.  4. Moderate aortic valve stenosis. The peak and mean gradients are 35 mmHg and 21 mmHg respectively. QUANTITATIVE DATA SUMMARY:  2D MEASUREMENTS:          Normal Ranges: IVSd:            1.15 cm  (0.6-1.1cm) LVPWd:           0.72 cm  (0.6-1.1cm) LVIDd:           4.61 cm  (3.9-5.9cm) LVIDs:           2.45 cm LV Mass Index:   72 g/m2 LVEDV Index:     41 ml/m2 LV % FS          47.0 %  LEFT ATRIUM:                  Normal Ranges: LA Vol A4C:        46.3 ml    (22+/-6mL/m2) LA Vol A2C:        40.0 ml LA Vol BP:         43.8 ml LA Vol Index A4C:  22.9ml/m2 LA Vol Index A2C:  19.8 ml/m2 LA Vol Index BP:    21.7 ml/m2 LA Area A4C:       17.3 cm2 LA Area A2C:       15.8 cm2 LA Major Axis A4C: 5.5 cm LA Major Axis A2C: 5.3 cm LA Volume Index:   21.8 ml/m2 LA Vol A4C:        41.3 ml LA Vol A2C:        39.4 ml LA Vol Index BSA:  20.0 ml/m2  RIGHT ATRIUM:          Normal Ranges: RA Area A4C:  13.2 cm2  AORTA MEASUREMENTS:         Normal Ranges: Ao Sinus, d:        2.90 cm (2.1-3.5cm) Asc Ao, d:          3.60 cm (2.1-3.4cm)  LV SYSTOLIC FUNCTION:                      Normal Ranges: EF-A4C View:    76 % (>=55%) EF-A2C View:    56 % EF-Biplane:     69 % LV EF Reported: 69 %  LV DIASTOLIC FUNCTION:             Normal Ranges: MV Peak E:             0.78 m/s    (0.7-1.2 m/s) MV Peak A:             0.90 m/s    (0.42-0.7 m/s) E/A Ratio:             0.86        (1.0-2.2) MV A Dur:              128.45 msec  MITRAL VALVE:          Normal Ranges: MV Vmax:      0.93 m/s (<=1.3m/s) MV peak PG:   3.5 mmHg (<5mmHg) MV mean P.5 mmHg (<2mmHg) MV VTI:       34.43 cm (10-13cm) MV DT:        182 msec (150-240msec)  AORTIC VALVE:                      Normal Ranges: AoV Vmax:                2.96 m/s  (<=1.7m/s) AoV Peak P.1 mmHg (<20mmHg) AoV Mean P.7 mmHg (1.7-11.5mmHg) LVOT Max Henry:            0.92 m/s  (<=1.1m/s) AoV VTI:                 70.07 cm  (18-25cm) LVOT VTI:                19.32 cm LVOT Diameter:           2.22 cm   (1.8-2.4cm) AoV Area, VTI:           1.07 cm2  (2.5-5.5cm2) AoV Area,Vmax:           1.21 cm2  (2.5-4.5cm2) AoV Dimensionless Index: 0.28  RIGHT VENTRICLE: RV Basal 2.20 cm RV Mid   1.30 cm RV Major 6.5 cm TAPSE:   16.0 mm  TRICUSPID VALVE/RVSP:          Normal Ranges: Peak TR Velocity:     1.72 m/s Est. RA Pressure:     3 RV Syst Pressure:     15       (< 30mmHg) IVC Diam:             1.30 cm  PULMONIC VALVE:          Normal Ranges: PV Max Henry:     1.3 m/s  (0.6-0.9m/s) PV Max P.4 mmHg  AORTA: Asc Ao Diam 3.60 cm  [id number] Ronald Fischer MD Electronically signed on 2025 at  2:30:47 PM  ** Final **     ECG 12 Lead  Result Date: 7/7/2025  Normal sinus rhythm Normal ECG When compared with ECG of 15-JUL-2024 08:07, (unconfirmed) No significant change was found    Electrocardiogram, 12-lead PRN ACS symptoms  Result Date: 7/7/2025  Normal sinus rhythm Normal ECG When compared with ECG of 04-JUL-2025 19:11, (unconfirmed) No significant change was found        Assessment    Assessment and Plan    Brayan Barron Jr. is a 70 y.o. male admitted on 7/4/2025 with dyspnea on exertion and chest pain.  Has a a past medical history significant for HTN, HLD, CAD status post PCI to RCA and PTCA of rPLV sidebranch.  Cardiology on board.  Patient taken for cardiac cath on 07/07 and had stent x 2 placed  Updates 07/07/25  :  Patient taken for LHC on 07/07 and had stent x2 placed    ACUTE MEDICAL ISSUES:  # ACS status post stenting 07/07  # History of CAD s/p PCI with LIZANDRO to dRCA and PTCA of rPLV sidebranch  # Aortic Stenosis  # HTN  # HFpEF  - SX: Presented with dyspnea on exertion with associated chest pain.  Upon admission had chest pain similar to the one he had in 2022  - Labs 07/04: troponins negative at 3 --> 3.  Lipid panel with elevated triglycerides at 168, normal cholesterol 122, LDL 54, VLDL 34, HDL 34.8.  BNP 18 (WNL)  - Imaging done included chest x-ray which showed no acute pulmonary abnormality.  Cardiomediastinal silhouette is normal in size and configuration  - Reviewed echo from 01/15/2025: LVEF 55 to 60%, spectral Doppler shows a grade 1 of LV diastolic filling with normal LA filling pressure.  Normal RV global systolic function.  Moderate AV stenosis  - Patient s/p aspirin loading and nitroglycerin in ED   : : HEART score 5   - HbA1c from 07/04/2025: 5.7  - TTE from 07/07/2025: LVEF 69%, normal RV global systolic function, Doppler estimated RVSP within normal limits at 15 mm medically, moderate AV stenosis  - Patient was taken for cardiac catheterization on 07/07 and had a stent x2  placed  - Post-catheterization, patient transferred to stepdown unit for close monitoring  PLAN:  - Cardiology on board  - Patient started on Brilinta 90 mg twice a day  - Continue Imdur 30 mg every  - Continue amlodipine 5 mg every day  - Continue aspirin 81 mg every day  - Continue losartan 50 mg every day  - Continue neurovascular checks on the extremity with left heart cath    CHRONIC MEDICAL ISSUES:  # HLD: Continue rosuvastatin 10 mg every day  # MDD/MIMA: Continue citalopram 20 mg every day  # GERD: Continue famotidine 40 mg every day.  Will start patient on Mylanta 10 cc twice a day to rule out any GI causes for the chest discomfort    Fluids: Bolus as needed  Electrolytes: Replete as needed  Nutrition: Adult regular diet  Antimicrobials: None indicated  DVT ppx: Lovenox  GI ppx: Pantoprazole, Mylanta  Catheter: None  Lines: 20-gauge PIV in right antecubital  Supplemental Oxygen: On room air  Emergency Contact: Extended Emergency Contact Information  Primary Emergency Contact: Christina Barron  Home Phone: [phone]  Relation: Spouse  Secondary Emergency Contact: Gloria Pearce  Mobile Phone: [phone]  Relation: Daughter  Preferred language: English   needed? No   Code: Full Code     Disposition: Will likely be discharged tomorrow after close monitoring    Ruslan Perry MD  Internal Medicine, PGY- 3  07/07/25 at 3:31 PM                  [1] alum-mag hydroxide-simeth, 10 mL, oral, BID  amLODIPine, 5 mg, oral, Daily  aspirin, 81 mg, oral, Daily  citalopram, 20 mg, oral, Daily  [Held by provider] enoxaparin, 40 mg, subcutaneous, q24h  famotidine, 40 mg, oral, Daily  isosorbide mononitrate ER, 30 mg, oral, Daily  losartan, 50 mg, oral, Daily  rosuvastatin, 10 mg, oral, Daily  ticagrelor, 90 mg, oral, BID     [2] sodium chloride 0.9%, 100 mL/hr, Last Rate: 100 mL/hr (07/07/25 0928)     [3] PRN medications: acetaminophen, nitroglycerin

## 2025-07-07 NOTE — SIGNIFICANT EVENT
Patient to [facility] [place] [facility] from Lab 1. Vitals stable and patient denies pain at this time. Family called to bedside.

## 2025-07-08 VITALS
OXYGEN SATURATION: 97 % | HEART RATE: 65 BPM | SYSTOLIC BLOOD PRESSURE: 118 MMHG | WEIGHT: 195.11 LBS | TEMPERATURE: 97.6 F | RESPIRATION RATE: 17 BRPM | BODY MASS INDEX: 29.57 KG/M2 | HEIGHT: 68 IN | DIASTOLIC BLOOD PRESSURE: 75 MMHG

## 2025-07-08 DIAGNOSIS — I20.0 UNSTABLE ANGINA (MULTI): ICD-10-CM

## 2025-07-08 DIAGNOSIS — I35.9 AORTIC VALVULAR DISORDER: ICD-10-CM

## 2025-07-08 DIAGNOSIS — I25.10 CORONARY ARTERY DISEASE INVOLVING NATIVE CORONARY ARTERY OF NATIVE HEART, UNSPECIFIED WHETHER ANGINA PRESENT: ICD-10-CM

## 2025-07-08 PROBLEM — R06.02 SHORTNESS OF BREATH: Status: RESOLVED | Noted: 2025-07-05 | Resolved: 2025-07-08

## 2025-07-08 PROBLEM — R07.9 CHEST PAIN, UNSPECIFIED TYPE: Status: RESOLVED | Noted: 2025-07-04 | Resolved: 2025-07-08

## 2025-07-08 LAB
ALBUMIN SERPL BCP-MCNC: 3.7 G/DL (ref 3.4–5)
ANION GAP SERPL CALC-SCNC: 10 MMOL/L (ref 10–20)
ATRIAL RATE: 68 BPM
BUN SERPL-MCNC: 24 MG/DL (ref 6–23)
CALCIUM SERPL-MCNC: 9.1 MG/DL (ref 8.6–10.3)
CHLORIDE SERPL-SCNC: 107 MMOL/L (ref 98–107)
CO2 SERPL-SCNC: 24 MMOL/L (ref 21–32)
CREAT SERPL-MCNC: 1.16 MG/DL (ref 0.5–1.3)
EGFRCR SERPLBLD CKD-EPI 2021: 67 ML/MIN/1.73M*2
ERYTHROCYTE [DISTWIDTH] IN BLOOD BY AUTOMATED COUNT: 12.8 % (ref 11.5–14.5)
GLUCOSE SERPL-MCNC: 106 MG/DL (ref 74–99)
HCT VFR BLD AUTO: 37 % (ref 41–52)
HGB BLD-MCNC: 12.1 G/DL (ref 13.5–17.5)
MAGNESIUM SERPL-MCNC: 1.94 MG/DL (ref 1.6–2.4)
MCH RBC QN AUTO: 30.9 PG (ref 26–34)
MCHC RBC AUTO-ENTMCNC: 32.7 G/DL (ref 32–36)
MCV RBC AUTO: 94 FL (ref 80–100)
NRBC BLD-RTO: 0 /100 WBCS (ref 0–0)
P AXIS: 25 DEGREES
P OFFSET: 193 MS
P ONSET: 146 MS
PHOSPHATE SERPL-MCNC: 3.5 MG/DL (ref 2.5–4.9)
PLATELET # BLD AUTO: 217 X10*3/UL (ref 150–450)
POTASSIUM SERPL-SCNC: 4 MMOL/L (ref 3.5–5.3)
PR INTERVAL: 156 MS
Q ONSET: 224 MS
QRS COUNT: 11 BEATS
QRS DURATION: 92 MS
QT INTERVAL: 410 MS
QTC CALCULATION(BAZETT): 435 MS
QTC FREDERICIA: 427 MS
R AXIS: 29 DEGREES
RBC # BLD AUTO: 3.92 X10*6/UL (ref 4.5–5.9)
SODIUM SERPL-SCNC: 137 MMOL/L (ref 136–145)
T AXIS: 13 DEGREES
T OFFSET: 429 MS
VENTRICULAR RATE: 68 BPM
WBC # BLD AUTO: 9.8 X10*3/UL (ref 4.4–11.3)

## 2025-07-08 PROCEDURE — 2500000002 HC RX 250 W HCPCS SELF ADMINISTERED DRUGS (ALT 637 FOR MEDICARE OP, ALT 636 FOR OP/ED): Performed by: PHYSICIAN ASSISTANT

## 2025-07-08 PROCEDURE — 99232 SBSQ HOSP IP/OBS MODERATE 35: CPT | Performed by: INTERNAL MEDICINE

## 2025-07-08 PROCEDURE — 2500000002 HC RX 250 W HCPCS SELF ADMINISTERED DRUGS (ALT 637 FOR MEDICARE OP, ALT 636 FOR OP/ED)

## 2025-07-08 PROCEDURE — 85027 COMPLETE CBC AUTOMATED: CPT

## 2025-07-08 PROCEDURE — 2500000001 HC RX 250 WO HCPCS SELF ADMINISTERED DRUGS (ALT 637 FOR MEDICARE OP)

## 2025-07-08 PROCEDURE — 36415 COLL VENOUS BLD VENIPUNCTURE: CPT

## 2025-07-08 PROCEDURE — 2500000001 HC RX 250 WO HCPCS SELF ADMINISTERED DRUGS (ALT 637 FOR MEDICARE OP): Performed by: PHYSICIAN ASSISTANT

## 2025-07-08 PROCEDURE — 83735 ASSAY OF MAGNESIUM: CPT

## 2025-07-08 PROCEDURE — 80069 RENAL FUNCTION PANEL: CPT

## 2025-07-08 PROCEDURE — 99238 HOSP IP/OBS DSCHRG MGMT 30/<: CPT

## 2025-07-08 RX ORDER — CLOPIDOGREL BISULFATE 75 MG/1
75 TABLET ORAL DAILY
Status: DISCONTINUED | OUTPATIENT
Start: 2025-07-09 | End: 2025-07-08 | Stop reason: HOSPADM

## 2025-07-08 RX ORDER — TICAGRELOR 90 MG/1
90 TABLET, FILM COATED ORAL 2 TIMES DAILY
Qty: 60 TABLET | Refills: 0 | Status: SHIPPED | OUTPATIENT
Start: 2025-07-08 | End: 2025-07-08 | Stop reason: HOSPADM

## 2025-07-08 RX ORDER — LOSARTAN POTASSIUM 50 MG/1
50 TABLET ORAL DAILY
Qty: 30 TABLET | Refills: 0 | Status: SHIPPED | OUTPATIENT
Start: 2025-07-09 | End: 2025-07-17 | Stop reason: SDUPTHER

## 2025-07-08 RX ORDER — ROSUVASTATIN CALCIUM 20 MG/1
20 TABLET, COATED ORAL DAILY
Qty: 30 TABLET | Refills: 0 | Status: SHIPPED | OUTPATIENT
Start: 2025-07-08 | End: 2025-07-17 | Stop reason: SDUPTHER

## 2025-07-08 RX ORDER — TRIAMCINOLONE ACETONIDE 1 MG/G
1 CREAM TOPICAL 2 TIMES DAILY PRN
COMMUNITY
End: 2025-07-08 | Stop reason: HOSPADM

## 2025-07-08 RX ORDER — CLOPIDOGREL BISULFATE 75 MG/1
75 TABLET ORAL DAILY
Qty: 90 TABLET | Refills: 3 | Status: SHIPPED | OUTPATIENT
Start: 2025-07-08 | End: 2026-07-08

## 2025-07-08 RX ORDER — ISOSORBIDE MONONITRATE 30 MG/1
30 TABLET, EXTENDED RELEASE ORAL DAILY
Qty: 30 TABLET | Refills: 0 | Status: SHIPPED | OUTPATIENT
Start: 2025-07-09 | End: 2025-07-17 | Stop reason: SDUPTHER

## 2025-07-08 RX ORDER — CLOPIDOGREL BISULFATE 300 MG/1
600 TABLET, FILM COATED ORAL ONCE
Status: COMPLETED | OUTPATIENT
Start: 2025-07-08 | End: 2025-07-08

## 2025-07-08 RX ADMIN — ROSUVASTATIN CALCIUM 10 MG: 10 TABLET, FILM COATED ORAL at 08:32

## 2025-07-08 RX ADMIN — AMLODIPINE BESYLATE 5 MG: 5 TABLET ORAL at 08:32

## 2025-07-08 RX ADMIN — FAMOTIDINE 40 MG: 20 TABLET, FILM COATED ORAL at 08:32

## 2025-07-08 RX ADMIN — ISOSORBIDE MONONITRATE 30 MG: 30 TABLET, EXTENDED RELEASE ORAL at 08:32

## 2025-07-08 RX ADMIN — ASPIRIN 81 MG: 81 TABLET, DELAYED RELEASE ORAL at 08:32

## 2025-07-08 RX ADMIN — CLOPIDOGREL BISULFATE 600 MG: 300 TABLET, FILM COATED ORAL at 12:54

## 2025-07-08 RX ADMIN — TICAGRELOR 90 MG: 90 TABLET, FILM COATED ORAL at 08:32

## 2025-07-08 RX ADMIN — CITALOPRAM HYDROBROMIDE 20 MG: 20 TABLET ORAL at 08:32

## 2025-07-08 ASSESSMENT — PAIN - FUNCTIONAL ASSESSMENT: PAIN_FUNCTIONAL_ASSESSMENT: 0-10

## 2025-07-08 ASSESSMENT — PAIN SCALES - GENERAL: PAINLEVEL_OUTOF10: 0 - NO PAIN

## 2025-07-08 NOTE — DISCHARGE INSTRUCTIONS
You are now stable enough to be discharged home.    The following recommendations are made for you at time of hospital discharge:  - Please take your home medications as instructed prior to hospitalization.   - The following changes to your home medications have been made during your hospital stay:  Continue taking amlodipine 5 mg every day  Continue taking your aspirin 81 mg every day  Start taking Plavix 75 mg every day  Your losartan has been decreased from 100 mg every day to 50 mg every day.  Start taking losartan 50 mg every day  Start taking Imdur 30 mg every day  We are increasing your rosuvastatin from 10 mg to 20 mg every day  - Continue taking your blood pressure every day.  - Please follow-up with your primary care provider within 5-7 days from time of discharge. Likely will need to bring photo ID and insurance card to your appointment.   - Please follow-up with cardiology in 1 month  -  services has been requested to make an appointment for you however if you do not hear back from them within 2-3 days, please call [phone] to find out about appointments with  services.  - If you experience any worsening symptoms or any new acute concerns arise, please contact your primary care provider to discuss and possibly arrange an appointment. If you cannot get in touch with provider or severe symptoms are present, please return to nearest emergency room/urgent care for evaluation and treatment.    It was a pleasure taking care of you.    All the best,  [facility] Inpatient Team

## 2025-07-08 NOTE — PROGRESS NOTES
Pharmacy Medication History Review    Brayan Barron Jr. is a 70 y.o. male admitted for Chest pain, unspecified type. Pharmacy reviewed the patient's [id number] medications and allergies for accuracy.    The list below reflectives the updated PTA list. Please review each medication in order reconciliation for additional clarification and justification.  Medications Prior to Admission   Medication Sig Dispense Refill Last Dose/Taking    amLODIPine (Norvasc) 5 mg tablet Take 1 tablet (5 mg) by mouth once daily. 90 tablet 3 7/4/2025 Morning    aspirin 81 mg EC tablet Take 1 tablet (81 mg) by mouth once daily.   7/4/2025 Morning    BERBERINE CHLOR-SEAWEED-CHROM ORAL Take 1,200 mg by mouth once daily.   7/4/2025 Morning    cholecalciferol (Vitamin D-3) 50 MCG (2000 UT) tablet Take 1 tablet (50 mcg) by mouth once daily. 90 tablet 1 7/4/2025 Morning    citalopram (CeleXA) 20 mg tablet Take 1 tablet (20 mg) by mouth once daily. 90 tablet 0 7/4/2025 Morning    famotidine (Pepcid) 40 mg tablet Take 1 tablet (40 mg) by mouth once daily. 90 tablet 1 7/4/2025 Morning    losartan (Cozaar) 100 mg tablet Take 1 tablet (100 mg) by mouth once daily. (Patient taking differently: Take 0.5 tablets (50 mg) by mouth once daily.) 90 tablet 1 7/4/2025 Morning    rosuvastatin (Crestor) 10 mg tablet Take 1 tablet (10 mg) by mouth once daily. 90 tablet 1 7/4/2025 Morning    triamcinolone (Kenalog) 0.1 % cream Apply 1 Application topically 2 times a day as needed for rash or irritation.           The list below reflectives the updated allergy list. Please review each documented allergy for additional clarification and justification.  Allergies  Reviewed by Kamila Rayo RN on 7/7/2025        Severity Reactions Comments    Codeine Low Hives, Rash             Below are additional concerns with the patient's PTA list.  Confirmed medications with patient.     [facility]

## 2025-07-08 NOTE — DISCHARGE SUMMARY
Department of Hospital Medicine  Discharge Summary   Brayan Barron Jr.  Age: 70 y.o.  MRN: [id number]  Date: 7/8/2025  Room Number: 250/250-A       Discharge Diagnosis   # Unstable angina status post  # Moderate aortic stenosis  # Chronic ischemic heart disease (7/8/2025: Distal LAD 2.5/15 mm [place] frontier LIZANDRO, mid LAD 2.5/30 mm [place] frontier LIZANDRO [not overlapping] with rescue PTCA of jailed diagonal; 2/4/2022: Distal RCA into the right PLV 2.5/20 mm Synergy LIZANDRO)    Discharge Summary   Brief HPI:  Brayan Barron Jr. is a 70 y.o. male with a PMH of HTN, HLD, CAD w/ PCI to RCA in 2/2022, aortic stenosis, GERD, CKD, and anxiety who presented to the hospital for shortness of breath on exertion and chest pain.     ED Course:  In ED, patient had elevated blood pressure at 180/86 mmHg.  Otherwise vitally stable.  Labs unremarkable.  At his baseline creatinine of 1.58.  D-dimer 478.  Troponin negative at 3 --> 3 . Chest x-ray done showed no acute pulmonary abnormality.  Patient was loaded with aspirin and given nitroglycerin For his chest pain and subsequently transferred to the floors    Floor course:  On floors, cardiology was consulted.   EKG- normal, Troponin- normal. BNP- normal.  His echo was done which showed LVEF 69%, normal RV global systolic function, Doppler estimated RVSP within normal limits at 15 mm medically, moderate AV stenosis . Given his elevated heart score of 5, there was plan to take the patient for left heart cath on 07/07.  Patient had stent placed x2.  He was observed overnight.  On 07/08 patient was discharged home with recommendations to follow-up with cardiology outpatient    Issue Requiring Follow Up   Follow-up with cardiology in 1 month  Follow up with PCP in 2 weeks    Discharge Recommendations   You are now stable enough to be discharged home.    The following recommendations are made for you at time of hospital discharge:  - Please take your home medications as instructed prior to  hospitalization.   - The following changes to your home medications have been made during your hospital stay:  Continue taking amlodipine 5 mg every day  Continue taking your aspirin 81 mg every day  Start taking Plavix 75 mg every day  Your losartan has been decreased from 100 mg every day to 50 mg every day.  Start taking losartan 50 mg every day  Start taking Imdur 30 mg every day  We have increased your Crestor from 10 mg to 20 mg every day.  Start taking 20 mg  - Continue taking your blood pressure every day.  - Please follow-up with your primary care provider within 5-7 days from time of discharge. Likely will need to bring photo ID and insurance card to your appointment.   - Please follow-up with cardiology in 1 month  -  services has been requested to make an appointment for you however if you do not hear back from them within 2-3 days, please call [phone] to find out about appointments with  services.  - If you experience any worsening symptoms or any new acute concerns arise, please contact your primary care provider to discuss and possibly arrange an appointment. If you cannot get in touch with provider or severe symptoms are present, please return to nearest emergency room/urgent care for evaluation and treatment.    It was a pleasure taking care of you.    All the best,  [facility] Inpatient Team       Discharge Meds        Your medication list        START taking these medications        Instructions Last Dose Given Next Dose Due   clopidogrel 75 mg tablet  Commonly known as: Plavix      Take 1 tablet (75 mg) by mouth once daily.       isosorbide mononitrate ER 30 mg 24 hr tablet  Commonly known as: Imdur  Start taking on: July 9, 2025      Take 1 tablet (30 mg) by mouth once daily. Do not crush or chew.              CHANGE how you take these medications        Instructions Last Dose Given Next Dose Due   losartan 50 mg tablet  Commonly known as: Cozaar  Start taking on: July 9,  2025  What changed:   medication strength  how much to take      Take 1 tablet (50 mg) by mouth once daily.              CONTINUE taking these medications        Instructions Last Dose Given Next Dose Due   amLODIPine 5 mg tablet  Commonly known as: Norvasc      Take 1 tablet (5 mg) by mouth once daily.       aspirin 81 mg EC tablet           BERBERINE CHLOR-SEAWEED-CHROM ORAL           cholecalciferol 50 mcg (2,000 units) tablet  Commonly known as: Vitamin D-3      Take 1 tablet (50 mcg) by mouth once daily.       citalopram 20 mg tablet  Commonly known as: CeleXA      Take 1 tablet (20 mg) by mouth once daily.       famotidine 40 mg tablet  Commonly known as: Pepcid      Take 1 tablet (40 mg) by mouth once daily.       rosuvastatin 10 mg tablet  Commonly known as: Crestor      Take 1 tablet (10 mg) by mouth once daily.              ASK your doctor about these medications        Instructions Last Dose Given Next Dose Due   triamcinolone 0.1 % cream  Commonly known as: Kenalog                     Where to Get Your Medications        These medications were sent to [facility]/pharmacy #[id number] [place] [address] [facility] AT [address] [address]      Phone: [phone]   clopidogrel 75 mg tablet  isosorbide mononitrate ER 30 mg 24 hr tablet  losartan 50 mg tablet          Vitals    Visit Vitals  /75 (BP Location: Left arm, Patient Position: Sitting)   Pulse 65   Temp 36.4 °C (97.6 °F) (Temporal)   Resp 17        Physical Exam   Physical Exam  Constitutional:       Appearance: Normal appearance.   HENT:      Head: Normocephalic and atraumatic.      Mouth/Throat:      Mouth: Mucous membranes are moist.   Cardiovascular:      Rate and Rhythm: Normal rate and regular rhythm.      Heart sounds: Murmur heard.      No friction rub. No gallop.   Pulmonary:      Breath sounds: Normal breath sounds. No stridor. No wheezing or rhonchi.   Abdominal:      General: Abdomen is flat. Bowel  sounds are normal.      Palpations: Abdomen is soft. There is no mass.      Tenderness: There is no abdominal tenderness. There is no guarding.   Musculoskeletal:         General: Swelling present. No tenderness. Normal range of motion.      Cervical back: Normal range of motion.   Neurological:      General: No focal deficit present.      Mental Status: He is alert and oriented to person, place, and time.   Psychiatric:         Mood and Affect: Mood normal.         Behavior: Behavior normal.          Future Appointments     Future Appointments   Date Time Provider Department [place]   7/16/2025  9:00 AM Ruth Lutz, APRN-CNP [id number] [facility]   7/22/2025  9:40 AM Beatriz Mcdermott, APRN-CNP [id number] [facility]          Ruslan Perry MD  Internal Medicine, PGY- 3  07/08/25 at 2:29 PM

## 2025-07-08 NOTE — PROGRESS NOTES
Subjective Data:  Doing well.  No chest pain.    Overnight Events:    No overnight events.     Objective Data:  Last Recorded Vitals:  Vitals:    07/08/25 0420 07/08/25 0449 07/08/25 0800 07/08/25 0805   BP: 105/60   118/75   BP Location: Left arm   Left arm   Patient Position: Lying   Sitting   Pulse: 65 73 75 74   Resp: 14 15 14 16   Temp: 36.6 °C (97.9 °F)   36.4 °C (97.6 °F)   TempSrc: Temporal   Temporal   SpO2: 97% 99% 98% 97%   Weight:       Height:           Last Labs:  CBC - 7/8/2025:  5:35 AM  9.8 12.1 217    37.0      CMP - 7/8/2025:  5:35 AM  9.1 7.9 15 --- 0.6   3.5 3.7 14 72      PTT - No results in last year.  _   _ _     TROPHS   Date/Time Value Ref Range Status   07/04/2025 08:35 PM 3 0 - 20 ng/L Final   07/04/2025 07:23 PM 3 0 - 20 ng/L Final     BNP   Date/Time Value Ref Range Status   07/04/2025 07:23 PM 18 0 - 99 pg/mL Final     HGBA1C   Date/Time Value Ref Range Status   07/04/2025 07:23 PM 5.7 See comment % Final   01/24/2025 07:30 AM 5.5 See comment % Final     LDLCALC   Date/Time Value Ref Range Status   07/04/2025 08:35 PM 54 <=99 mg/dL Final     Comment:                                 Near   Borderline      AGE      Desirable  Optimal    High     High     Very High     0-19 Y     0 - 109     ---    110-129   >/= 130     ----    20-24 Y     0 - 119     ---    120-159   >/= 160     ----      >24 Y     0 -  99   100-129  130-159   160-189     >/=190    LDL Cholesterol is calculated using the Friedewald equation.   01/24/2025 07:30 AM 77 <=99 mg/dL Final     Comment:                                 Near   Borderline      AGE      Desirable  Optimal    High     High     Very High     0-19 Y     0 - 109     ---    110-129   >/= 130     ----    20-24 Y     0 - 119     ---    120-159   >/= 160     ----      >24 Y     0 -  99   100-129  130-159   160-189     >/=190     05/20/2024 11:50 AM 76 <=99 mg/dL Final     Comment:                                 Near   Borderline      AGE      Desirable   Optimal    High     High     Very High     0-19 Y     0 - 109     ---    110-129   >/= 130     ----    20-24 Y     0 - 119     ---    120-159   >/= 160     ----      >24 Y     0 -  99   100-129  130-159   160-189     >/=190       VLDL   Date/Time Value Ref Range Status   07/04/2025 08:35 PM 34 0 - 40 mg/dL Final   01/24/2025 07:30 AM 27 0 - 40 mg/dL Final   05/20/2024 11:50 AM 30 0 - 40 mg/dL Final      Last I/O:  I/O last 3 completed shifts:  In: 2784.2 (31.5 mL/kg) [I.V.:2313.3 (26.1 mL/kg); IV Piggyback:470.9]  Out: 680 (7.7 mL/kg) [Urine:650 (0.2 mL/kg/hr); Blood:30]  Weight: 88.5 kg     Past Cardiology Tests (Last 3 Years):  EKG:  Electrocardiogram, 12-lead PRN ACS symptoms 07/05/2025 (Preliminary)      ECG 12 Lead 07/04/2025 (Preliminary)      ECG 12 lead (Clinic Performed) 07/15/2024 (Preliminary)    Echo:  Transthoracic Echo Complete 07/07/2025      Transthoracic Echo (TTE) Complete 01/15/2025    Ejection Fractions:  EF   Date/Time Value Ref Range Status   07/07/2025 11:00 AM 69 %    01/15/2025 08:45 AM 58 %      Cath:  No results found for this or any previous visit from the past 1095 days.    Stress Test:  Nuclear Stress Test 12/20/2023    Cardiac Imaging:  No results found for this or any previous visit from the past 1095 days.      Inpatient Medications:  Scheduled Medications[1]  PRN Medications[2]  Continuous Medications[3]    Physical Exam:  S1-S2 normal, regular rate and rhythm, systolic ejection murmur  2+ right radial pulse.     Assessment/Plan     Chronic ischemic heart disease (7/8/2025: Distal LAD 2.5/15 mm [place] frontier LIZANDRO, mid LAD 2.5/30 mm [place] frontier LIZANDRO [not overlapping] with rescue PTCA of jailed diagonal; 2/4/2022: Distal RCA into the right PLV 2.5/20 mm Synergy LIZANDRO)  Moderate aortic stenosis  Hypertension  Hyperlipidemia  Chronic kidney disease, stage III    Guideline directed medical therapy for stable ischemic heart disease: Aspirin 81 mg daily and clopidogrel 75 mg daily.   Rosuvastatin 10 mg daily.  Will transition from ticagrelor to clopidogrel today due to the affordability of ticagrelor; patient should receive his clopidogrel 600 mg bolus prior to discharge.  Continue amlodipine 5 mg daily as an antianginal and for blood pressure control.  Continue losartan 50 mg daily.  Can stop isosorbide mononitrate at this time.    Follow-up in [facility] on 7/16/2025 with Ruth Lutz. We will sign off.  These do not hesitate to reconsult should any issues arise.        Peripheral IV 07/07/25 20 G Left Antecubital (Active)   Site Assessment Clean;Dry;Intact 07/08/25 0900   Dressing Status Clean;Dry;Occlusive 07/08/25 0900   Number of days: 1       Code Status:  Full Code      Zena Barton PA-C         [1]   Scheduled medications   Medication Dose Route Frequency    alum-mag hydroxide-simeth  10 mL oral BID    amLODIPine  5 mg oral Daily    aspirin  81 mg oral Daily    citalopram  20 mg oral Daily    [Held by provider] enoxaparin  40 mg subcutaneous q24h    famotidine  40 mg oral Daily    isosorbide mononitrate ER  30 mg oral Daily    losartan  50 mg oral Daily    rosuvastatin  10 mg oral Daily    ticagrelor  90 mg oral BID   [2]   PRN medications   Medication    acetaminophen    nitroglycerin   [3]   Continuous Medications   Medication Dose Last Rate

## 2025-07-08 NOTE — PROGRESS NOTES
07/08/25 1203   Discharge Planning   Living Arrangements Spouse/significant other   Support Systems Spouse/significant other   Assistance Needed patient is A&Ox4; independent in ADL's, no AD, no DME, drives; room air, no cpap or bipap, not active with any HHC, PCP Beatriz Mcdermott   Type of Residence Private residence   Who is requesting discharge planning? Provider   Expected Discharge Disposition Home  (Home with spouse, denies any HHC needs, had heart cath with 2 stents 7/7, on room air, spouse will transport home, ADOD 7/8)   Does the patient need discharge transport arranged? No

## 2025-07-08 NOTE — CARE PLAN
The patient's goals for the shift include      The clinical goals for the shift include Pt will have no bleeding from cath site through end of shift.    Goal achieved

## 2025-07-08 NOTE — CARE PLAN
The patient's goals for the shift include  to be free from cp and sob    The clinical goals for the shift include Pt will have no bleeding from cath site through end of shift.

## 2025-07-09 ENCOUNTER — PATIENT OUTREACH (OUTPATIENT)
Dept: PRIMARY CARE | Facility: CLINIC | Age: 70
End: 2025-07-09
Payer: MEDICARE

## 2025-07-09 NOTE — PROGRESS NOTES
Discharge Facility: [facility]  Discharge Diagnosis: Chest Pain, SOB  Admission Date: 4 July 25  Discharge Date: 8 July 25    PCP Appointment Date: 22 July 25  Specialist Appointment Date: 16 July 25 (Cardiologyhuy)  Hospital Encounter and Summary Linked: Yes  ED to Hosp-Admission (Discharged) with Mo Cedillo MD; King Spencer DO (07/04/2025)     See discharge assessment below for further details     Wrap Up  Wrap Up Additional Comments: Pt stating he is doing well since his discharge. all newly prescribed medications obtained without difficulty. pt has no questions regarding medications or discharge instructions at this time. follow up set by another for 22 july 25. pt confirms appt and is agreeable to contact afterward. (7/9/2025  9:37 AM)  Call End Time: 0937 (7/9/2025  9:37 AM)    Engagement  Call Start Time: 0927 (Spoke with patient) (7/9/2025  9:37 AM)    Medications  Medications reviewed with patient/caregiver?: Yes (7/9/2025  9:37 AM)  Is the patient having any side effects they believe may be caused by any medication additions or changes?: No (7/9/2025  9:37 AM)  Does the patient have all medications ordered at discharge?: Yes (7/9/2025  9:37 AM)  Prescription Comments: all meds obtained without difficulty (7/9/2025  9:37 AM)  Is the patient taking all medications as directed (includes completed medication regime)?: Yes (7/9/2025  9:37 AM)  Medication Comments: no questions on medications at this time. (7/9/2025  9:37 AM)    Appointments  Does the patient have a primary care provider?: Yes (follow up made by another for 22 july 25) (7/9/2025  9:37 AM)  Care Management Interventions: Verified appointment date/time/provider (7/9/2025  9:37 AM)  Has the patient kept scheduled appointments due by today?: Yes (7/9/2025  9:37 AM)  Care Management Interventions: Advised patient to keep appointment (7/9/2025  9:37 AM)    Self Management  What is the home health agency?: None (7/9/2025  9:37 AM)  Has  home health visited the patient within 72 hours of discharge?: Not applicable (7/9/2025  9:37 AM)  What Durable Medical Equipment (DME) was ordered?: None (7/9/2025  9:37 AM)    Patient Teaching  Does the patient have access to their discharge instructions?: Yes (7/9/2025  9:37 AM)  Care Management Interventions: Reviewed instructions with patient (7/9/2025  9:37 AM)  What is the patient's perception of their health status since discharge?: Improving (7/9/2025  9:37 AM)  Is the patient/caregiver able to teach back the hierarchy of who to call/visit for symptoms/problems? PCP, Specialist, Home Health nurse, Urgent Care, ED, 911: Yes (7/9/2025  9:37 AM)  Patient/Caregiver Education Comments: None (7/9/2025  9:37 AM)

## 2025-07-11 NOTE — SIGNIFICANT EVENT
Follow up phone call: spoke directly with patient. Patient reports he is doing well, has picked up his rx and has made his follow up appointments. Patient requested the number for cardiac rehab to schedule his appointment with them and I provided that number. No further questions/concerns or needs at this time.

## 2025-07-15 DIAGNOSIS — Z95.5 STENTED CORONARY ARTERY: ICD-10-CM

## 2025-07-16 ENCOUNTER — CLINICAL SUPPORT (OUTPATIENT)
Dept: CARDIAC REHAB | Facility: HOSPITAL | Age: 70
End: 2025-07-16
Payer: MEDICARE

## 2025-07-16 ENCOUNTER — OFFICE VISIT (OUTPATIENT)
Dept: CARDIOLOGY | Facility: HOSPITAL | Age: 70
End: 2025-07-16
Payer: MEDICARE

## 2025-07-16 VITALS
WEIGHT: 190.92 LBS | SYSTOLIC BLOOD PRESSURE: 146 MMHG | OXYGEN SATURATION: 98 % | BODY MASS INDEX: 29.27 KG/M2 | DIASTOLIC BLOOD PRESSURE: 88 MMHG | HEART RATE: 68 BPM

## 2025-07-16 DIAGNOSIS — Z95.5 STENTED CORONARY ARTERY: ICD-10-CM

## 2025-07-16 DIAGNOSIS — I10 HYPERTENSION, UNSPECIFIED TYPE: ICD-10-CM

## 2025-07-16 DIAGNOSIS — I35.0 AORTIC VALVE STENOSIS, ETIOLOGY OF CARDIAC VALVE DISEASE UNSPECIFIED: ICD-10-CM

## 2025-07-16 DIAGNOSIS — E78.5 HYPERLIPIDEMIA, UNSPECIFIED HYPERLIPIDEMIA TYPE: Primary | ICD-10-CM

## 2025-07-16 DIAGNOSIS — I25.10 CORONARY ARTERY DISEASE INVOLVING NATIVE CORONARY ARTERY OF NATIVE HEART WITHOUT ANGINA PECTORIS: ICD-10-CM

## 2025-07-16 PROCEDURE — 3079F DIAST BP 80-89 MM HG: CPT | Performed by: NURSE PRACTITIONER

## 2025-07-16 PROCEDURE — 1159F MED LIST DOCD IN RCRD: CPT | Performed by: NURSE PRACTITIONER

## 2025-07-16 PROCEDURE — G2211 COMPLEX E/M VISIT ADD ON: HCPCS | Performed by: NURSE PRACTITIONER

## 2025-07-16 PROCEDURE — 1036F TOBACCO NON-USER: CPT | Performed by: NURSE PRACTITIONER

## 2025-07-16 PROCEDURE — 99214 OFFICE O/P EST MOD 30 MIN: CPT | Performed by: NURSE PRACTITIONER

## 2025-07-16 PROCEDURE — 99212 OFFICE O/P EST SF 10 MIN: CPT

## 2025-07-16 PROCEDURE — 1111F DSCHRG MED/CURRENT MED MERGE: CPT | Performed by: NURSE PRACTITIONER

## 2025-07-16 PROCEDURE — 3077F SYST BP >= 140 MM HG: CPT | Performed by: NURSE PRACTITIONER

## 2025-07-16 ASSESSMENT — ENCOUNTER SYMPTOMS
HEMATOLOGIC/LYMPHATIC NEGATIVE: 1
ENDOCRINE NEGATIVE: 1
COUGH: 1
CONSTITUTIONAL NEGATIVE: 1
PSYCHIATRIC NEGATIVE: 1
GASTROINTESTINAL NEGATIVE: 1
EYES NEGATIVE: 1
NEUROLOGICAL NEGATIVE: 1
MYALGIAS: 1

## 2025-07-16 NOTE — PROGRESS NOTES
"Name: Brayan Barron Jr.   : 1955   Diagnosis: CAD  MRN: [id number]   Onset Date:  2025 PCI   Today's Date: 25      Cardiovascular   HX: CAD, HTN, and HLD  Family HX of CAD:  No  Angina: none  Describe:  Last Episode:  History of Heart Failure: No  EF: Over 50%  Onset of HF:  Last HF hospitalization:  Family HX of HF:  Yes  HF symptoms: DENIES    Devices: NA  HX of PAD: No    Arrythmias: normal sinus rhythm  Apical: regular  Heart Rate: 76  BP: 138/76  Radial pulses:  R Present 2+ L Present 2+    Comments:      Respiratory  HX: DENIES  Dyspnea:  No  Describe:  HX YE:  No  CPAP Use:  No  Family History of Lung Disease:  No    Resting O2 sat: 98%  Lung Sounds: CLEAR  Locations: all lobes    Comments:    Neurological   Orientation: oriented to person, place, time, and general circumstances  HX: DENIES   History of stroke/TIA?: STATES POSSIBLY HAD A TIA UNSURE OF YEAR- \" CALLED IT A TIA BECAUSE THEY WEREN'T SURE\"    Comments:      Skin  Skin Color: normal, no cyanosis, jaundice, pallor or bruising  Edema: NONE     Comments:    Gastrointestinal/Genitourinary  HX: GERD  Comments:      Psychosocial  Marital status:   Children: 2  Lives alone:  No  Lives with: SPOUSE  Drives:  Yes  Occupation: is retired   Caretaker of family member?:  No  Do you feel safe at home?:  Yes    Caffeinated drinks per day: 3  Alcoholic drinks per day/week:2X PER WEEK 1-2 DRINKS  HX drug or alcohol abuse?: No  Current use of illicit drugs?: No    Comments:    Musculoskeletal  HX of injury/surgery:  BILATERAL TOTAL KNEE REPLACEMENT  Comments:    Pain Assessment  Current pain: DENIES  Location:  Description:    Comments:    Fall Risk Assessment  Assistive device: no device  Needs assistance:  No  Afraid of falling:  No  Fall within the past 6 months?: No  Injured with fall:  Fall risk results: low     "

## 2025-07-16 NOTE — PROGRESS NOTES
Referred by Dr. Abrams ref. provider found for No chief complaint on file.     History Of Present Illness:    Brayan Barron Jr. is a very pleasant 70 year old gentleman with a history of CAD, HTN, HLD and mild aortic stenosis, he is here for a follow up visit. The patient is seen in collaboration with Dr. Lemus.  Mr. Barron admitted 7/4/2025 with increased shortness of breath and chest pressure. Heart cath had a PCI x2 to the LAD. Yesterday started feeling better. Has not been active. Denies chest pain. He is going to start cardiac rehab.       Review of Systems   Constitutional: Negative.   HENT: Negative.     Eyes: Negative.    Cardiovascular:  Positive for chest pain.   Respiratory:  Positive for cough.    Endocrine: Negative.    Hematologic/Lymphatic: Negative.    Skin: Negative.    Musculoskeletal:  Positive for myalgias.   Gastrointestinal: Negative.    Neurological: Negative.    Psychiatric/Behavioral: Negative.            Past Medical History:  He has a past medical history of Anxiety, CAD (coronary artery disease), native coronary artery, Colon polyps, Dupuytren contracture, Encounter for colorectal cancer screening (02/19/2025), GERD (gastroesophageal reflux disease), HLD (hyperlipidemia), HTN (hypertension), Moderate aortic valve stenosis (07/15/2024), and Vitamin D deficiency.    Past Surgical History:  He has a past surgical history that includes XR knee (Right, 09/26/2008); XR knee (Left, 05/18/2015); Hand contracture release (Right, 01/10/2018); Cardiac catheterization (02/04/2022); Cataract extraction (Left, 02/02/2021); Cataract extraction (Right, 10/29/2019); Colonoscopy (07/17/2015); Cardiac catheterization (N/A, 7/7/2025); Cardiac catheterization (N/A, 7/7/2025); Cardiac catheterization (N/A, 7/7/2025); Cardiac catheterization (N/A, 7/7/2025); and Cardiac catheterization (N/A, 7/7/2025).      Social History:  He reports that he has never smoked. He has never used smokeless tobacco. He reports current  alcohol use. He reports that he does not use drugs.    Family History:  No family history on file.     Allergies:  Codeine    Outpatient Medications:  Current Outpatient Medications   Medication Instructions    amLODIPine (NORVASC) 5 mg, oral, Daily    aspirin 81 mg EC tablet 1 tablet, Daily    BERBERINE CHLOR-SEAWEED-CHROM ORAL 1,200 mg, Daily    cholecalciferol (VITAMIN D-3) 50 mcg, oral, Daily    citalopram (CELEXA) 20 mg, oral, Daily    clopidogrel (PLAVIX) 75 mg, oral, Daily    famotidine (PEPCID) 40 mg, oral, Daily    isosorbide mononitrate ER (IMDUR) 30 mg, oral, Daily, Do not crush or chew.    losartan (COZAAR) 50 mg, oral, Daily    rosuvastatin (CRESTOR) 20 mg, oral, Daily        Last Recorded Vitals:  Vitals:    07/16/25 0840   BP: 146/88   Pulse: 68   SpO2: 98%   Weight: 86.6 kg (190 lb 14.7 oz)         Physical Exam:  Physical Exam  Vitals reviewed.   HENT:      Head: Normocephalic.      Nose: Nose normal.   Eyes:      Pupils: Pupils are equal, round, and reactive to light.   Cardiovascular:      Rate and Rhythm: Normal rate and regular rhythm. 2/6 YOON   Pulmonary:      Effort: Pulmonary effort is normal.      Breath sounds: Normal breath sounds.   Abdominal:      General: Abdomen is flat.      Palpations: Abdomen is soft.   Musculoskeletal:         General: Normal range of motion.      Cervical back: Normal range of motion.   Skin:     General: Skin is warm and dry.   Neurological:      General: No focal deficit present.      Mental Status: He is alert and oriented to person, place, and time.   Psychiatric:         Mood and Affect: Mood normal.            Last Labs:  CBC -  Lab Results   Component Value Date    WBC 9.8 07/08/2025    HGB 12.1 (L) 07/08/2025    HCT 37.0 (L) 07/08/2025    MCV 94 07/08/2025     07/08/2025       CMP -  Lab Results   Component Value Date    CALCIUM 9.1 07/08/2025    PHOS 3.5 07/08/2025    PROT 7.9 01/24/2025    ALBUMIN 3.7 07/08/2025    AST 15 01/24/2025    ALT 14  01/24/2025    ALKPHOS 72 01/24/2025    BILITOT 0.6 01/24/2025       LIPID PANEL -   Lab Results   Component Value Date    CHOL 122 07/04/2025    TRIG 168 (H) 07/04/2025    HDL 34.8 07/04/2025    CHHDL 3.5 07/04/2025    VLDL 34 07/04/2025    NHDL 87 07/04/2025       RENAL FUNCTION PANEL -   Lab Results   Component Value Date    GLUCOSE 106 (H) 07/08/2025     07/08/2025    K 4.0 07/08/2025     07/08/2025    CO2 24 07/08/2025    ANIONGAP 10 07/08/2025    BUN 24 (H) 07/08/2025    CREATININE 1.16 07/08/2025    GFRMALE 58 (A) 01/28/2022    CALCIUM 9.1 07/08/2025    PHOS 3.5 07/08/2025    ALBUMIN 3.7 07/08/2025        Lab Results   Component Value Date    BNP 18 07/04/2025    HGBA1C 5.7 (H) 07/04/2025       Last Cardiology Tests:  ECG:     Echo:  Echocardiogram 7/7/2025  1. Left ventricular ejection fraction is normal calculated by Hernandez's biplane at 69%.   2. There is normal right ventricular global systolic function.   3. The Doppler estimated RVSP is within normal limits at 15 mmHg.   4. Moderate aortic valve stenosis. The peak and mean gradients are 35 mmHg and 21 mmHg respectively.    Echocardiogram 1/21/2022  1. The left ventricular systolic function is normal with a 60-65% estimated ejection fraction.  2. RVSP within normal limits.  3. Mild to moderate aortic valve stenosis.  4. The aortic valve dimensionless index is 0.35. There is no evidence of aortic valve regurgitation. The peak instantaneous gradient of the aortic valve is 19.5 mmHg. The mean gradient of the aortic valve is 11.0 mmHg. SHERRI 1.44 cm2.  Ejection Fractions:  LVEF 60-65%  Cath:  Heart cath 2/4/2022  1. Left main: mild irregularities.  2. LAD: 30% mid-vessel disease, 50% ostial D1.  3. LCx: no significant angiographic disease.  4. RCA: 90% distal stenosis, 90% ostial rPDA stenosis, 90% ostial rPDA disease ([place] 1,1,1).  5. LVEDP 12mmHg, no aortic stenosis on LV-Ao gradient.  6. Successful PCI to severe distal RCA bifurcation stenosis  ([place] 1,1,1) with one Synergy LIZANDRO with PTCA of rPLV sidebranch.    Stress Test:  Nuclear stress test 12/2023  1. Myocardial perfusion study without evidence of stress-induced  ischemia.  2. No evidence of myocardial infarction.  3. The left ventricle is normal in size.  4. Normal LV wall motion with an LV EF estimated at greater than 65%.      Cardiac Imaging:  CT HEART CALCIUM SCORING WO IV CONTRAST 01/25/2022  CT calcium score 1/25/2022  LM 40.2,  .3,  LCx 0,  RCA 69.9,     Total 483.4    Assessment/Plan   Mr. Barron is a very pleasant 70 year old gentleman with a history of moderate aortic stenosis, HTN, HLD and a family history of CAD, he had a CT calcium score of 483, heart cath with a PCI to the RCA (2022). He was admitted 7/4/2025, with chest pressure and shortness of breath, he had a PCI x2 to the LAD. Denies chest pain. He will be starting cardiac rehab. Feeling better. Heart rate and blood pressure is well controlled today.       Plan   -call with any questions   -continue Aspirin 81 mg daily indefinitely and Plavix 75 mg daily   -continue Losartan 50 mg daily and Crestor 10 mg daily   -continue Imdur ER 30 mg daily and Amlodipine 5 mg daily   -follow up in six months         ZEENAT Espinoza-CNP

## 2025-07-17 ENCOUNTER — HOSPITAL ENCOUNTER (OUTPATIENT)
Dept: CARDIOLOGY | Facility: HOSPITAL | Age: 70
Discharge: HOME | End: 2025-07-17
Payer: MEDICARE

## 2025-07-17 DIAGNOSIS — I25.10 CORONARY ARTERY DISEASE INVOLVING NATIVE CORONARY ARTERY OF NATIVE HEART, UNSPECIFIED WHETHER ANGINA PRESENT: ICD-10-CM

## 2025-07-17 DIAGNOSIS — I20.0 UNSTABLE ANGINA (MULTI): ICD-10-CM

## 2025-07-17 DIAGNOSIS — E78.5 HYPERLIPIDEMIA, UNSPECIFIED HYPERLIPIDEMIA TYPE: ICD-10-CM

## 2025-07-17 DIAGNOSIS — I35.9 AORTIC VALVULAR DISORDER: ICD-10-CM

## 2025-07-17 DIAGNOSIS — Z95.5 STENTED CORONARY ARTERY: ICD-10-CM

## 2025-07-17 PROCEDURE — 93017 CV STRESS TEST TRACING ONLY: CPT

## 2025-07-17 PROCEDURE — 93018 CV STRESS TEST I&R ONLY: CPT | Performed by: STUDENT IN AN ORGANIZED HEALTH CARE EDUCATION/TRAINING PROGRAM

## 2025-07-17 PROCEDURE — 93016 CV STRESS TEST SUPVJ ONLY: CPT | Performed by: STUDENT IN AN ORGANIZED HEALTH CARE EDUCATION/TRAINING PROGRAM

## 2025-07-18 ENCOUNTER — DOCUMENTATION (OUTPATIENT)
Dept: CARDIAC REHAB | Facility: HOSPITAL | Age: 70
End: 2025-07-18
Payer: MEDICARE

## 2025-07-18 RX ORDER — ROSUVASTATIN CALCIUM 20 MG/1
20 TABLET, COATED ORAL DAILY
Qty: 90 TABLET | Refills: 2 | Status: SHIPPED | OUTPATIENT
Start: 2025-07-18

## 2025-07-18 RX ORDER — LOSARTAN POTASSIUM 50 MG/1
50 TABLET ORAL DAILY
Qty: 90 TABLET | Refills: 2 | Status: SHIPPED | OUTPATIENT
Start: 2025-07-18

## 2025-07-18 RX ORDER — ISOSORBIDE MONONITRATE 30 MG/1
30 TABLET, EXTENDED RELEASE ORAL DAILY
Qty: 90 TABLET | Refills: 2 | Status: SHIPPED | OUTPATIENT
Start: 2025-07-18 | End: 2026-04-14

## 2025-07-18 NOTE — PROGRESS NOTES
Cardiac Rehabilitation Initial Treatment Plan    Name: Brayan Barron Jr.  Medical Record Number: [id number]  YOB: 1955  Age: 70 y.o.    Today’s Date: 7/18/2025  Primary Care Physician: Beatriz Mcdermott APRN-CNP  Referring Physician: DR. NICOLE PIEDRA  Program Location: [facility]  Primary Diagnosis: PCI  Onset/Date of Diagnosis: 7/07/2025    Initial Assessment, not yet started program.    AACVPR Risk Stratification:  LOW    Falls Risk: Low  Psychosocial Assessment   INTAKE PHQ-9=1 MINIMAL    Sent PH-Q 9 to MD if score > 20: No; score < 20    Pt reported/currently experiencing stress: No  Patient uses stress management skills: No   History of: no history of anxiety or depression  Currently seeing a mental health provider: No  Social Support: Yes, Whom:FAMILY  Quality of Life Survey: MACEY CONNER  Quality of Life Survey:  PRE POST   GLOBAL SCORE 28.72/30    HEALTH/FUNCTIONING SCORE 27.25/30    SOCIAL/ECONOMIC SCORE 30/30    PSYCHOLOGICAL/SPIRITUAL SCORE 30/30    FAMILY SCORE 29.50/30    Learning Assessment:  Learning assessment/barriers: None  Preferred learning method: Reading handout  Barriers: None  Comments:    Stages of Change:Maintenance    Psychosocial Plan    Goal Status: Initial Assessment; goals not yet started    Psychosocial Goals: Demonstrating proper techniques for stress management, Maintain or lower PH-Q 9 score by discharge, Identify strategies for managing depression, and Identify social supports    Psychosocial Interventions/Education: *STRESS MANAGEMENT HANDOUT  *ASK PATIENT AT LEAST ONCE EVERY 30 DAYS ABOUT STRESS     Initial Assessment: QUALITY OF LIFE AND PHQ-9 SURVEYS COMPLETED AT INTAKE EVALUATION. PATIENT STATES THAT HE HAS A GOOD SUPPORT SYSTEM AND FEELS THAT HE DOES NOT HAVE ANY STRESS CURRENTLY.    Nutrition Assessment:    Hyperlipidemia: Yes     Lipids:   Lab Results   Component Value Date    CHOL 122 07/04/2025    HDL 34.8 07/04/2025    TRIG 168 (H) 07/04/2025        Current Dietary Guidelines: Low sodium  Barriers to dietary change: no    Diet Habit Survey: Rate Your Plate  Pre: RYP53/69   Post: To be done at discharge.    Diabetes Assessment    Lab Results   Component Value Date    HGBA1C 5.7 (H) 07/04/2025       History of Diabetes: No    Weight Management  HEIGHT: 5'8  INTAKE WEIGHT:191.8      Nutrition Plan    Goal Status: Initial Assessment; goals not yet started    Nutrition Goals: Adapt a low-sodium, DASH diet prior to discharge, Learn how to read and interpret nutrition labels prior to discharge, Lose 1lb/week while enrolled in program, and IMRPOVE OR MAINTAIN RYP SURVEY RESULTS    Nutrition Interventions/Education:   *NUTRITION EDUCATION HANDOUTS  *CHOLESTEROL MANAGEMENT HANDOUT     Initial Assessment: DIET SURVEY - RATE YOUR PLATE COMPLETED AT INTAKE EVALUATION. BMI=29.2/ GOAL .     Exercise Assessment    Yes  Mode: WALK  Frequency: DAILY  Duration: 60 MIN    Exercise Prescription     Exercise Prescription based on: Pre-rehab stress test and Patient health history   Frequency:  2 days/week   Mode: Treadmill, NuStep, and Recumbent Cycle   Duration: 30-40 total aerobic minutes   Intensity: RPE 12-14  Target HR:  122-142  MET Level: 2.7-3.6  Patient wears supplemental O2: No     Modality Workload METs Duration (minutes)   1 Pre-Exercise      2 Treadmill 2.2  2.7 08 :00   3 NuStep 55@4 3.2 08 :00   4 Recumbent Bike 44@5 3.6 08 :00   5 Weights 3-5 lbs x 15 REPS (1 Set)  10 :00   6 Post-Exercise        Resistance Training: No   Home Exercise Prescription given: No    Exercise Plan    Goal Status: Initial Assessment; goals not yet started    Exercise Goals: Increase exercise MET level by 5-10% each week, Increase total exercise duration to 30-45 minutes, Obtain 150 minutes/week of moderate intensity aerobic exercise, Initiate strength training 2-3 days a week, Initiate flexibility training 2-3 days a week, and Establish a home exercise program before  discharge    Exercise Interventions/Education:   *EXERCISE FUNDAMENTALS AND MAINTENANCE HANDOUT  *REVIEW THR AND INSTRUCTIONS FOR RADIAL PULSE CHECK     Initial Assessment: EST COMPLETED.     Other Core Components/Risk Factor Assessment:    Medication adherence  Current Medications:   Medication Documentation Review Audit       Reviewed by America Mtz MA (Medical Assistant) on 07/16/25 at 0840      Medication Order Taking? Sig Documenting Provider Last Dose Status   amLODIPine (Norvasc) 5 mg tablet [id number] No Take 1 tablet (5 mg) by mouth once daily. ZEENAT Aceves-CNP 7/4/2025 Morning Active   aspirin 81 mg EC tablet [id number] No Take 1 tablet (81 mg) by mouth once daily. Historical Provider, MD 7/4/2025 Morning Active   BERBERINE CHLOR-SEAWEED-CHROM ORAL [id number] No Take 1,200 mg by mouth once daily. Historical Provider, MD 7/4/2025 Morning Active   cholecalciferol (Vitamin D-3) 50 MCG (2000 UT) tablet [id number] No Take 1 tablet (50 mcg) by mouth once daily. ZEENAT Nagy-CNP 7/4/2025 Morning Active   citalopram (CeleXA) 20 mg tablet [id number] No Take 1 tablet (20 mg) by mouth once daily. ZEENAT Nagy-CNP 7/4/2025 Morning Active   clopidogrel (Plavix) 75 mg tablet [id number]  Take 1 tablet (75 mg) by mouth once daily. Zena Barton PA-C  Active   famotidine (Pepcid) 40 mg tablet [id number] No Take 1 tablet (40 mg) by mouth once daily. ZEENAT Nagy-CNP 7/4/2025 Morning Active   isosorbide mononitrate ER (Imdur) 30 mg 24 hr tablet [id number]  Take 1 tablet (30 mg) by mouth once daily. Do not crush or chew. Ruslan Perry MD  Active   losartan (Cozaar) 50 mg tablet [id number]  Take 1 tablet (50 mg) by mouth once daily. Ruslan Perry MD  Active   rosuvastatin (Crestor) 20 mg tablet [id number]  Take 1 tablet (20 mg) by mouth once daily. Ruslan Perry MD  Active                                 Medication compliance: Yes   Uses pill box/organizer: Yes    Carries  medication list: Yes     Blood Pressure Management  History of Hypertension: Yes   Medication Changes: No   Resting BP:  There were no vitals taken for this visit.     Heart Failure Management  Hx of Heart Failure: No    Smoking/Tobacco Assessment  Tobacco Use History[1]    Other Core Component Plan    Goal Status: Initial Assessment; goals not yet started    Other Core Component Goals: Medication compliance, Verbalize medication usage and drug actions by discharge, Achieve resting BP of < 130/80 by discharge, and improve knowledge of risk factors for cardiac disease and ways to manage specific risks    Other Core Component Interventions/Education:   *EDUCATION: CAD, HYPERTENSION AND STROKE, RISK FACTORS FOR HEART DISEASE, UNDERSTANDING HEART VALVE DISEASE, UNDERSTANDING CHF.  *PROVIDE MED EDUCATION TOOL  *PROVIDE MED LIST IF NEEDED     Initial Assessment: KNOWLEDGE QUIZ COMPLETED. REVIEWED EDUCATION SCHEDULE.    Individual Patient Goals:    INCREASED FLEXIBILITY BY END OF PROGRAM  RIDE BIKE 1-2 MILES BY END OF PROGRAM    Goal Status: Initial Assessment; goals not yet started    Staff Comments:  ADVANCED DIRECTIVES ADDRESSED AT INTAKE EVALUATION    Rehab Staff Signature: Nellie Davila RN             [1]   Social History  Tobacco Use   Smoking Status Never   Smokeless Tobacco Never

## 2025-07-21 ENCOUNTER — CLINICAL SUPPORT (OUTPATIENT)
Dept: CARDIAC REHAB | Facility: HOSPITAL | Age: 70
End: 2025-07-21
Payer: MEDICARE

## 2025-07-21 DIAGNOSIS — Z95.5 STENTED CORONARY ARTERY: ICD-10-CM

## 2025-07-21 PROCEDURE — 93798 PHYS/QHP OP CAR RHAB W/ECG: CPT | Performed by: INTERNAL MEDICINE

## 2025-07-22 ENCOUNTER — APPOINTMENT (OUTPATIENT)
Dept: PRIMARY CARE | Facility: CLINIC | Age: 70
End: 2025-07-22
Payer: MEDICARE

## 2025-07-22 VITALS
BODY MASS INDEX: 30.97 KG/M2 | DIASTOLIC BLOOD PRESSURE: 72 MMHG | OXYGEN SATURATION: 98 % | HEART RATE: 74 BPM | WEIGHT: 202 LBS | SYSTOLIC BLOOD PRESSURE: 136 MMHG

## 2025-07-22 DIAGNOSIS — Z79.899 ON LONG TERM DRUG THERAPY: ICD-10-CM

## 2025-07-22 DIAGNOSIS — Z09 HOSPITAL DISCHARGE FOLLOW-UP: Primary | ICD-10-CM

## 2025-07-22 DIAGNOSIS — Z13.220 ENCOUNTER FOR LIPID SCREENING FOR CARDIOVASCULAR DISEASE: ICD-10-CM

## 2025-07-22 DIAGNOSIS — Z00.00 ROUTINE ADULT HEALTH MAINTENANCE: ICD-10-CM

## 2025-07-22 DIAGNOSIS — E55.9 VITAMIN D DEFICIENCY: ICD-10-CM

## 2025-07-22 DIAGNOSIS — R73.09 BLOOD GLUCOSE ABNORMAL: ICD-10-CM

## 2025-07-22 DIAGNOSIS — Z13.6 ENCOUNTER FOR LIPID SCREENING FOR CARDIOVASCULAR DISEASE: ICD-10-CM

## 2025-07-22 DIAGNOSIS — I25.10 CORONARY ARTERY DISEASE INVOLVING NATIVE CORONARY ARTERY OF NATIVE HEART, UNSPECIFIED WHETHER ANGINA PRESENT: ICD-10-CM

## 2025-07-22 DIAGNOSIS — Z12.5 SCREENING FOR PROSTATE CANCER: ICD-10-CM

## 2025-07-22 DIAGNOSIS — Z13.220 SCREENING FOR LIPID DISORDERS: ICD-10-CM

## 2025-07-22 DIAGNOSIS — Z13.1 ENCOUNTER FOR SCREENING FOR DIABETES MELLITUS: ICD-10-CM

## 2025-07-22 DIAGNOSIS — Z00.00 ENCOUNTER FOR HEALTH MAINTENANCE EXAMINATION IN ADULT: ICD-10-CM

## 2025-07-22 DIAGNOSIS — Z13.29 SCREENING FOR THYROID DISORDER: ICD-10-CM

## 2025-07-22 DIAGNOSIS — Z13.21 ENCOUNTER FOR VITAMIN DEFICIENCY SCREENING: ICD-10-CM

## 2025-07-22 PROCEDURE — 1159F MED LIST DOCD IN RCRD: CPT | Performed by: NURSE PRACTITIONER

## 2025-07-22 PROCEDURE — 99495 TRANSJ CARE MGMT MOD F2F 14D: CPT | Performed by: NURSE PRACTITIONER

## 2025-07-22 PROCEDURE — 3078F DIAST BP <80 MM HG: CPT | Performed by: NURSE PRACTITIONER

## 2025-07-22 PROCEDURE — 1111F DSCHRG MED/CURRENT MED MERGE: CPT | Performed by: NURSE PRACTITIONER

## 2025-07-22 PROCEDURE — 1036F TOBACCO NON-USER: CPT | Performed by: NURSE PRACTITIONER

## 2025-07-22 PROCEDURE — 3075F SYST BP GE 130 - 139MM HG: CPT | Performed by: NURSE PRACTITIONER

## 2025-07-22 ASSESSMENT — ENCOUNTER SYMPTOMS
FEVER: 0
ABDOMINAL PAIN: 0
VOMITING: 0
CHILLS: 0
NUMBNESS: 0
COLOR CHANGE: 0
TROUBLE SWALLOWING: 0
FATIGUE: 0
SLEEP DISTURBANCE: 0
SHORTNESS OF BREATH: 0
DYSURIA: 0
NAUSEA: 0
ARTHRALGIAS: 1
COUGH: 0
DIZZINESS: 0
PALPITATIONS: 0
BRUISES/BLEEDS EASILY: 0
BACK PAIN: 1
SORE THROAT: 0
WHEEZING: 0
LIGHT-HEADEDNESS: 0
DIFFICULTY URINATING: 0

## 2025-07-22 NOTE — PATIENT INSTRUCTIONS
Please arrange for routine follow up in 6 months. You may schedule on-line through [facility] or call our office at [phone].    Orders are in place for you to have fasting blood work completed prior to your next appointment. Please do not eat or drink 8 hours prior to having your blood drawn.   You may have your blood work completed in this building through [facility] ([id number] [facility] [address], [place] [id number]).  For this location, you may schedule an appointment online or drop-in for walk-in services. [email or web address]  Hours:   Monday - Friday: 7:30 a.m. - 5 p.m. and Saturday: 8 a.m. - 11 a.m.  Phone:  [phone]

## 2025-07-22 NOTE — ASSESSMENT & PLAN NOTE
Patient continues on daily statin, aspirin and plavix per cardiology recommendations. Patient to notify provider for any new cardiac concerns. He is to maintain routine follow-up with cardiology.

## 2025-07-22 NOTE — PROGRESS NOTES
Subjective   Patient ID: Brayan Barron Jr. is a 70 y.o. male who presents for Follow-up (Pt reports feeling better after his stint placement. It took about 10 days after procedure before he could get going. Working on his endurance. Every day.  Started rehab yesterday- agreed to 12 sessions. ).    HPI     Patient seen in office today for follow-up appointment. Patient reports feeling better since his stent placement, immediately after procedure patient needed 10 days to recover. Patient reports that he feels as though his energy level is back to baseline but his endurance is still on the lower side. Patient has recently started cardiac rehab that he reports is going pretty well. Patient stays active at home by gardening, landscaping, riding his bicycle, golfing, and exercising in the gym. Patient is also very active with his grandchildren which keeps him busy.   Patient does not have any acute concerns at this time.    Medications Ordered Prior to Encounter[1]    Medical History[2]    Surgical History[3]    Family History[4]    Review of Systems   Constitutional:  Negative for chills, fatigue and fever.   HENT:  Negative for dental problem, sore throat and trouble swallowing.    Eyes:         Wearing glasses   Respiratory:  Negative for cough, shortness of breath and wheezing.    Cardiovascular:  Negative for chest pain and palpitations.   Gastrointestinal:  Negative for abdominal pain, nausea and vomiting.   Endocrine: Negative for cold intolerance and heat intolerance.   Genitourinary:  Negative for difficulty urinating and dysuria.   Musculoskeletal:  Positive for arthralgias and back pain.   Skin:  Negative for color change and rash.   Allergic/Immunologic: Negative for environmental allergies.   Neurological:  Negative for dizziness, light-headedness and numbness.   Hematological:  Does not bruise/bleed easily.   Psychiatric/Behavioral:  Negative for sleep disturbance.        Objective   /72   Pulse 74    Wt 91.6 kg (202 lb)   SpO2 98%   BMI 30.97 kg/m²     Physical Exam  Constitutional:       Appearance: Normal appearance. He is normal weight.   HENT:      Head: Normocephalic.      Right Ear: Ear canal and external ear normal.      Left Ear: Ear canal and external ear normal.      Nose: Nose normal.      Mouth/Throat:      Mouth: Mucous membranes are moist.      Pharynx: Oropharynx is clear.     Eyes:      Pupils: Pupils are equal, round, and reactive to light.      Comments: Wearing glasses     Cardiovascular:      Rate and Rhythm: Normal rate and regular rhythm.      Pulses: Normal pulses.      Heart sounds: Murmur heard.   Pulmonary:      Effort: Pulmonary effort is normal.      Breath sounds: Normal breath sounds.   Abdominal:      General: Abdomen is flat.      Palpations: Abdomen is soft.     Musculoskeletal:         General: Normal range of motion.      Cervical back: Normal range of motion.     Skin:     General: Skin is warm and dry.     Neurological:      General: No focal deficit present.      Mental Status: He is alert and oriented to person, place, and time. Mental status is at baseline.     Psychiatric:         Mood and Affect: Mood normal.         Behavior: Behavior normal.         Thought Content: Thought content normal.         Judgment: Judgment normal.         Assessment/Plan   Problem List Items Addressed This Visit           ICD-10-CM    CAD (coronary artery disease) I25.10    Patient continues on daily statin, aspirin and plavix per cardiology recommendations. Patient to notify provider for any new cardiac concerns. He is to maintain routine follow-up with cardiology.          Relevant Orders    CBC and Auto Differential    Comprehensive Metabolic Panel    Encounter for health maintenance examination in adult Z00.00    Routine blood work ordered for assessment purposed. Will continue to monitor as appropriate.         Routine adult health maintenance Z00.00    Relevant Orders    CBC and Auto  Differential    Comprehensive Metabolic Panel    Hemoglobin A1C    Lipid Panel    Vitamin D 25-Hydroxy,Total (for eval of Vitamin D levels)    TSH with reflex to Free T4 if abnormal    Prostate Specific Antigen     Other Visit Diagnoses         Codes      Hospital discharge follow-up    -  Primary Z09      Blood glucose abnormal     R73.09    Relevant Orders    Hemoglobin A1C      Encounter for screening for diabetes mellitus     Z13.1    Relevant Orders    Hemoglobin A1C      Encounter for lipid screening for cardiovascular disease     Z13.220, Z13.6    Relevant Orders    Lipid Panel      Screening for lipid disorders     Z13.220    Relevant Orders    Lipid Panel      Encounter for vitamin deficiency screening     Z13.21    Relevant Orders    Vitamin D 25-Hydroxy,Total (for eval of Vitamin D levels)      On long term drug therapy     Z79.899    Relevant Orders    Vitamin D 25-Hydroxy,Total (for eval of Vitamin D levels)      Vitamin D deficiency     E55.9    Relevant Orders    Vitamin D 25-Hydroxy,Total (for eval of Vitamin D levels)      Screening for thyroid disorder     Z13.29    Relevant Orders    TSH with reflex to Free T4 if abnormal      Screening for prostate cancer     Z12.5    Relevant Orders    Prostate Specific Antigen          I was present with the NP student who participated in the documentation of this note. I personally saw and evaluated the patient and performed my own history and examination. I discussed the case with the NP student. I have reviewed, verified, and revised the note as necessary and agree with the content and plan as written by the NP student.               [1]   Current Outpatient Medications on File Prior to Visit   Medication Sig Dispense Refill    amLODIPine (Norvasc) 5 mg tablet Take 1 tablet (5 mg) by mouth once daily. 90 tablet 3    aspirin 81 mg EC tablet Take 1 tablet (81 mg) by mouth once daily.      BERBERINE CHLOR-SEAWEED-CHROM ORAL Take 1,200 mg by mouth once daily.       cholecalciferol (Vitamin D-3) 50 MCG (2000 UT) tablet Take 1 tablet (50 mcg) by mouth once daily. 90 tablet 1    citalopram (CeleXA) 20 mg tablet Take 1 tablet (20 mg) by mouth once daily. 90 tablet 0    clopidogrel (Plavix) 75 mg tablet Take 1 tablet (75 mg) by mouth once daily. 90 tablet 3    famotidine (Pepcid) 40 mg tablet Take 1 tablet (40 mg) by mouth once daily. 90 tablet 1    isosorbide mononitrate ER (Imdur) 30 mg 24 hr tablet Take 1 tablet (30 mg) by mouth once daily. Do not crush or chew. 90 tablet 2    losartan (Cozaar) 50 mg tablet Take 1 tablet (50 mg) by mouth once daily. 90 tablet 2    rosuvastatin (Crestor) 20 mg tablet Take 1 tablet (20 mg) by mouth once daily. 90 tablet 2    [DISCONTINUED] isosorbide mononitrate ER (Imdur) 30 mg 24 hr tablet Take 1 tablet (30 mg) by mouth once daily. Do not crush or chew. 30 tablet 0    [DISCONTINUED] losartan (Cozaar) 50 mg tablet Take 1 tablet (50 mg) by mouth once daily. 30 tablet 0    [DISCONTINUED] rosuvastatin (Crestor) 20 mg tablet Take 1 tablet (20 mg) by mouth once daily. 30 tablet 0     No current facility-administered medications on file prior to visit.   [2]   Past Medical History:  Diagnosis Date    Anxiety     CAD (coronary artery disease), native coronary artery     Colon polyps     Dupuytren contracture     Encounter for colorectal cancer screening 02/19/2025    GERD (gastroesophageal reflux disease)     HLD (hyperlipidemia)     HTN (hypertension)     Moderate aortic valve stenosis 07/15/2024    Vitamin D deficiency    [3]   Past Surgical History:  Procedure Laterality Date    CARDIAC CATHETERIZATION  02/04/2022    times 2    CARDIAC CATHETERIZATION N/A 7/7/2025    Procedure: Left & Right Heart Cath w Angiography & LV;  Surgeon: Luis Daniel Lemus MD;  Location: [facility] Cardiac Cath Lab;  Service: Cardiovascular;  Laterality: N/A;    CARDIAC CATHETERIZATION N/A 7/7/2025    Procedure: PCI LIZANDRO Stent- Coronary;  Surgeon: Luis Daniel Lemus MD;   Location: [facility] Cardiac Cath Lab;  Service: Cardiovascular;  Laterality: N/A;    CARDIAC CATHETERIZATION N/A 7/7/2025    Procedure: Angioplasty - Coronary;  Surgeon: Luis Daniel Lemus MD;  Location: [facility] Cardiac Cath Lab;  Service: Cardiovascular;  Laterality: N/A;    CARDIAC CATHETERIZATION N/A 7/7/2025    Procedure: FFR (Fractional Flow Reserve) - During Cardiac Catheterization Procedure;  Surgeon: Luis Daniel Lemus MD;  Location: [facility] Cardiac Cath Lab;  Service: Cardiovascular;  Laterality: N/A;    CARDIAC CATHETERIZATION N/A 7/7/2025    Procedure: OCT (Optical Coherence Tomography);  Surgeon: Luis Daniel Lemus MD;  Location: [facility] Cardiac Cath Lab;  Service: Cardiovascular;  Laterality: N/A;    CATARACT EXTRACTION Left 02/02/2021    CATARACT EXTRACTION Right 10/29/2019    COLONOSCOPY  07/17/2015    HAND CONTRACTURE RELEASE Right 01/10/2018    dupuytrens contracture    KNEE Right 09/26/2008    KNEE Left 05/18/2015   [4] No family history on file.

## 2025-07-23 ENCOUNTER — PATIENT OUTREACH (OUTPATIENT)
Dept: PRIMARY CARE | Facility: CLINIC | Age: 70
End: 2025-07-23
Payer: MEDICARE

## 2025-07-23 ENCOUNTER — CLINICAL SUPPORT (OUTPATIENT)
Dept: CARDIAC REHAB | Facility: HOSPITAL | Age: 70
End: 2025-07-23
Payer: MEDICARE

## 2025-07-23 DIAGNOSIS — Z95.5 STENTED CORONARY ARTERY: ICD-10-CM

## 2025-07-23 PROCEDURE — 93798 PHYS/QHP OP CAR RHAB W/ECG: CPT | Performed by: INTERNAL MEDICINE

## 2025-07-23 NOTE — PROGRESS NOTES
Unable to reach patient for follow up call after recent PCP appt.   Left voicemail with call back number for patient to call if needed   If no voicemail available call attempts x 2 were made to contact the patient to assist with any questions or concerns patient may have.

## 2025-07-28 ENCOUNTER — CLINICAL SUPPORT (OUTPATIENT)
Dept: CARDIAC REHAB | Facility: HOSPITAL | Age: 70
End: 2025-07-28
Payer: MEDICARE

## 2025-07-28 DIAGNOSIS — Z95.5 STENTED CORONARY ARTERY: ICD-10-CM

## 2025-07-28 PROCEDURE — 93798 PHYS/QHP OP CAR RHAB W/ECG: CPT

## 2025-07-29 LAB
ATRIAL RATE: 67 BPM
P AXIS: 62 DEGREES
P OFFSET: 194 MS
P ONSET: 146 MS
PR INTERVAL: 158 MS
Q ONSET: 225 MS
QRS COUNT: 11 BEATS
QRS DURATION: 88 MS
QT INTERVAL: 382 MS
QTC CALCULATION(BAZETT): 403 MS
QTC FREDERICIA: 396 MS
R AXIS: 20 DEGREES
T AXIS: 33 DEGREES
T OFFSET: 416 MS
VENTRICULAR RATE: 67 BPM

## 2025-07-30 ENCOUNTER — CLINICAL SUPPORT (OUTPATIENT)
Dept: CARDIAC REHAB | Facility: HOSPITAL | Age: 70
End: 2025-07-30
Payer: MEDICARE

## 2025-07-30 DIAGNOSIS — Z95.5 STENTED CORONARY ARTERY: ICD-10-CM

## 2025-07-30 PROCEDURE — 93798 PHYS/QHP OP CAR RHAB W/ECG: CPT

## 2025-08-04 ENCOUNTER — CLINICAL SUPPORT (OUTPATIENT)
Dept: CARDIAC REHAB | Facility: HOSPITAL | Age: 70
End: 2025-08-04
Payer: MEDICARE

## 2025-08-04 DIAGNOSIS — Z95.5 STENTED CORONARY ARTERY: ICD-10-CM

## 2025-08-04 PROCEDURE — 93798 PHYS/QHP OP CAR RHAB W/ECG: CPT

## 2025-08-06 ENCOUNTER — TELEPHONE (OUTPATIENT)
Dept: PRIMARY CARE | Facility: CLINIC | Age: 70
End: 2025-08-06

## 2025-08-06 ENCOUNTER — CLINICAL SUPPORT (OUTPATIENT)
Dept: CARDIAC REHAB | Facility: HOSPITAL | Age: 70
End: 2025-08-06
Payer: MEDICARE

## 2025-08-06 DIAGNOSIS — F41.9 ANXIETY: ICD-10-CM

## 2025-08-06 DIAGNOSIS — Z95.5 STENTED CORONARY ARTERY: ICD-10-CM

## 2025-08-06 PROCEDURE — 93798 PHYS/QHP OP CAR RHAB W/ECG: CPT | Performed by: INTERNAL MEDICINE

## 2025-08-06 NOTE — TELEPHONE ENCOUNTER
Rx Refill Request Telephone Encounter    Name:  Brayan Barron JrKwame  :  [id number]  Medication Name:  CeleXA 20 mg - take one tab once a day - 90 day supply  Specific Pharmacy location:  [facility]  Date of last appointment:  2025  Date of next appointment:  2026  Best number to reach patient:  [phone]

## 2025-08-07 RX ORDER — CITALOPRAM 20 MG/1
20 TABLET ORAL DAILY
Qty: 90 TABLET | Refills: 1 | Status: SHIPPED | OUTPATIENT
Start: 2025-08-07

## 2025-08-11 ENCOUNTER — CLINICAL SUPPORT (OUTPATIENT)
Dept: CARDIAC REHAB | Facility: HOSPITAL | Age: 70
End: 2025-08-11
Payer: MEDICARE

## 2025-08-11 DIAGNOSIS — Z95.5 STENTED CORONARY ARTERY: ICD-10-CM

## 2025-08-11 PROCEDURE — 93798 PHYS/QHP OP CAR RHAB W/ECG: CPT | Performed by: INTERNAL MEDICINE

## 2025-08-13 ENCOUNTER — CLINICAL SUPPORT (OUTPATIENT)
Dept: CARDIAC REHAB | Facility: HOSPITAL | Age: 70
End: 2025-08-13
Payer: MEDICARE

## 2025-08-13 DIAGNOSIS — Z95.5 STENTED CORONARY ARTERY: ICD-10-CM

## 2025-08-13 PROCEDURE — 93798 PHYS/QHP OP CAR RHAB W/ECG: CPT | Performed by: INTERNAL MEDICINE

## 2025-08-14 ENCOUNTER — DOCUMENTATION (OUTPATIENT)
Dept: CARDIAC REHAB | Facility: HOSPITAL | Age: 70
End: 2025-08-14
Payer: MEDICARE

## 2025-08-18 ENCOUNTER — CLINICAL SUPPORT (OUTPATIENT)
Dept: CARDIAC REHAB | Facility: HOSPITAL | Age: 70
End: 2025-08-18
Payer: MEDICARE

## 2025-08-18 DIAGNOSIS — Z95.5 STENTED CORONARY ARTERY: ICD-10-CM

## 2025-08-18 PROCEDURE — 93798 PHYS/QHP OP CAR RHAB W/ECG: CPT | Performed by: INTERNAL MEDICINE

## 2025-08-20 ENCOUNTER — CLINICAL SUPPORT (OUTPATIENT)
Dept: CARDIAC REHAB | Facility: HOSPITAL | Age: 70
End: 2025-08-20
Payer: MEDICARE

## 2025-08-20 DIAGNOSIS — Z95.5 STENTED CORONARY ARTERY: ICD-10-CM

## 2025-08-20 PROCEDURE — 93798 PHYS/QHP OP CAR RHAB W/ECG: CPT

## 2025-08-25 ENCOUNTER — CLINICAL SUPPORT (OUTPATIENT)
Dept: CARDIAC REHAB | Facility: HOSPITAL | Age: 70
End: 2025-08-25
Payer: MEDICARE

## 2025-08-25 DIAGNOSIS — Z95.5 STENTED CORONARY ARTERY: ICD-10-CM

## 2025-08-25 PROCEDURE — 93798 PHYS/QHP OP CAR RHAB W/ECG: CPT | Performed by: INTERNAL MEDICINE

## 2025-08-26 RX ORDER — TICAGRELOR 60 MG/1
TABLET, FILM COATED ORAL
Refills: 0 | OUTPATIENT
Start: 2025-08-26

## 2025-08-27 ENCOUNTER — CLINICAL SUPPORT (OUTPATIENT)
Dept: CARDIAC REHAB | Facility: HOSPITAL | Age: 70
End: 2025-08-27
Payer: MEDICARE

## 2025-08-27 DIAGNOSIS — Z95.5 STENTED CORONARY ARTERY: ICD-10-CM

## 2025-08-27 PROCEDURE — 93798 PHYS/QHP OP CAR RHAB W/ECG: CPT

## 2026-01-16 ENCOUNTER — APPOINTMENT (OUTPATIENT)
Dept: PRIMARY CARE | Facility: CLINIC | Age: 71
End: 2026-01-16
Payer: MEDICARE

## (undated) DEVICE — CATHETER, OPTITORQUE, 5FR, TIG1, 1H/100CM

## (undated) DEVICE — GUIDEWIRE, INQUIRE, J TIP, .035 X 210CM, FIXED CORE, DIAGNOSTIC

## (undated) DEVICE — NEEDLE, ENTRY, PERCUTANEOUS, 21 G X 2.5 CM

## (undated) DEVICE — GUIDEWIRE, PRESSURE WIRE X , 175CM WIRELESS, AGILE TIP

## (undated) DEVICE — CATHETER, BALLOON, NC EUPHORA NONCOMPLIANT 2.0 X 12 X 142CM

## (undated) DEVICE — CATHETER, DRAGONFLY, OPSTAR

## (undated) DEVICE — CATHETER, WEDGE PRESSURE, BALLOON, DOUBLE LUMEN, 5 FR, 110 CM

## (undated) DEVICE — INTRODUCER SHEATH, GLIDESHEATH, 6FR 10CM

## (undated) DEVICE — ANGIO KIT, LEFT HEART, LF, CUSTOM

## (undated) DEVICE — INFLATION DEVICE, BASIXCOMPAX, 30 ATM/BAR, 20ML  MAP152, 24IN TUBING

## (undated) DEVICE — CATHETER, BALLOON, NC EUPHORA NONCOMPLIANT 2.25 X 12 X 142CM

## (undated) DEVICE — INTRODUCER SHEATH, GLIDESHEATH, 5FR 10CM

## (undated) DEVICE — CATHETER, BALLOON, NC EUPHORA NONCOMPLIANT 2.5 X 12 X 142CM

## (undated) DEVICE — CATHETER, BALLOON, TAKERU RX PTCA, 1.5MM X 12MM

## (undated) DEVICE — GUIDEWIRE, GO2WIRE, STEERABLE, 0.035 X 210CM, STANDARD STRAIGHT

## (undated) DEVICE — CATHETER, GUIDING, LAUNCHER, 6FR, JL 3.5

## (undated) DEVICE — TR BAND, RADIAL COMPRESSION, STANDARD, 24CM

## (undated) DEVICE — GUIDEWIRE, INQWIRE, 0.018IN X 180CM, FIXED CORE, STRAIGHT TIP

## (undated) DEVICE — GUIDEWIRE, RUN THROUGH WIRE, 180CM